# Patient Record
Sex: FEMALE | Race: WHITE | NOT HISPANIC OR LATINO | Employment: STUDENT | URBAN - METROPOLITAN AREA
[De-identification: names, ages, dates, MRNs, and addresses within clinical notes are randomized per-mention and may not be internally consistent; named-entity substitution may affect disease eponyms.]

---
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
## 2020-01-06 DIAGNOSIS — R11.10 CHRONIC VOMITING: ICD-10-CM

## 2020-01-06 DIAGNOSIS — K21.9 GASTROESOPHAGEAL REFLUX DISEASE WITHOUT ESOPHAGITIS: Primary | ICD-10-CM

## 2020-02-06 ENCOUNTER — OFFICE VISIT (OUTPATIENT)
Dept: GASTROENTEROLOGY | Facility: CLINIC | Age: 20
End: 2020-02-06

## 2020-02-06 VITALS
SYSTOLIC BLOOD PRESSURE: 116 MMHG | BODY MASS INDEX: 43.43 KG/M2 | TEMPERATURE: 98.2 F | HEIGHT: 64 IN | DIASTOLIC BLOOD PRESSURE: 72 MMHG | WEIGHT: 254.4 LBS

## 2020-02-06 DIAGNOSIS — R10.12 LEFT UPPER QUADRANT PAIN: Primary | ICD-10-CM

## 2020-02-06 DIAGNOSIS — K21.9 GASTROESOPHAGEAL REFLUX DISEASE, ESOPHAGITIS PRESENCE NOT SPECIFIED: ICD-10-CM

## 2020-02-06 DIAGNOSIS — R11.2 NAUSEA AND VOMITING, INTRACTABILITY OF VOMITING NOT SPECIFIED, UNSPECIFIED VOMITING TYPE: ICD-10-CM

## 2020-02-06 PROCEDURE — 99204 OFFICE O/P NEW MOD 45 MIN: CPT | Performed by: INTERNAL MEDICINE

## 2020-02-06 RX ORDER — ONDANSETRON 4 MG/1
TABLET, FILM COATED ORAL
COMMUNITY
Start: 2020-02-04

## 2020-02-06 RX ORDER — SODIUM CHLORIDE, SODIUM LACTATE, POTASSIUM CHLORIDE, CALCIUM CHLORIDE 600; 310; 30; 20 MG/100ML; MG/100ML; MG/100ML; MG/100ML
30 INJECTION, SOLUTION INTRAVENOUS CONTINUOUS
Status: CANCELLED | OUTPATIENT
Start: 2020-03-06

## 2020-02-06 NOTE — PROGRESS NOTES
Chief Complaint   Patient presents with   • Abdominal Pain   • Heartburn        Marcela Jimenez is a  19 y.o. female here for an initial visit for GERD, abdominal pain    HPI this 19-year-old white female patient of Dr. Damaso Pitts presents with a one-year history of abdominal pain with associated nausea and vomiting.  The pain is located along the left costal margin and radiating to the left flank.  She describes a dull aching sensation but the quality can return to sharp and stabbing especially under high stressful situations.  The pain may last for hours at a time and up to 2 days long between events.  There may be an influence with dietary intake but not always and it is not associated with bowel function.  Her normal bowel pattern is usually one stool per day no reported melena or bright red blood per rectum.  She has had extensive work-up in the past including a CT scan by her pediatrician which was negative except for a tiny kidney stone, and ultrasound which was also normal per her account and a gastric emptying study performed in December of last year which was all negative as well.  Her weight is been stable, she denies any swallowing difficulties.  She does admit to migraines that can cause nausea as well as motion sickness when riding in a car.  She notes even after eating her vomitus is usually bile-stained but without food.    Past Medical History:   Diagnosis Date   • Migraine        Current Outpatient Medications   Medication Sig Dispense Refill   • aspirin-acetaminophen-caffeine (EXCEDRIN MIGRAINE) 250-250-65 MG per tablet Take  by mouth.     • cetirizine (zyrTEC) 10 MG tablet Take  by mouth Daily.     • fluticasone (FLONASE) 50 MCG/ACT nasal spray USE 2 SPRAYS IEN QHS  1   • galcanezumab-gnlm (EMGALITY) 120 MG/ML prefilled syringe Inject 1 mL under the skin into the appropriate area as directed Every 30 (Thirty) Days. 1.12 mL 11   • ondansetron (ZOFRAN) 4 MG tablet      • rizatriptan MLT  (MAXALT-MLT) 10 MG disintegrating tablet Take 1 tablet by mouth 1 (One) Time As Needed for Migraine for up to 1 dose. May repeat in 2 hours if needed 9 tablet 11     No current facility-administered medications for this visit.        PRN Meds:.    No Known Allergies    Social History     Socioeconomic History   • Marital status: Single     Spouse name: Not on file   • Number of children: Not on file   • Years of education: Not on file   • Highest education level: Not on file   Tobacco Use   • Smoking status: Never Smoker   • Smokeless tobacco: Never Used   Substance and Sexual Activity   • Alcohol use: No   • Drug use: No   • Sexual activity: Defer       Family History   Problem Relation Age of Onset   • Stroke Maternal Grandfather    • Diabetes Maternal Grandfather    • Prostate cancer Maternal Grandfather    • Diabetes Mother    • Arthritis Father    • Diabetes Maternal Grandmother    • Arthritis Paternal Grandmother    • Stroke Paternal Grandfather    • Heart disease Paternal Grandfather        Review of Systems   Constitutional: Negative for activity change, appetite change, fatigue and unexpected weight change.   HENT: Negative for congestion, facial swelling, sore throat, trouble swallowing and voice change.    Eyes: Negative for photophobia and visual disturbance.   Respiratory: Negative for cough and choking.    Cardiovascular: Negative for chest pain.   Gastrointestinal: Positive for abdominal pain, nausea and vomiting. Negative for abdominal distention, anal bleeding, blood in stool, constipation, diarrhea and rectal pain.   Endocrine: Negative for polyphagia.   Musculoskeletal: Negative for arthralgias, gait problem and joint swelling.   Skin: Negative for color change, pallor and rash.   Allergic/Immunologic: Negative for food allergies.   Neurological: Negative for speech difficulty and headaches.   Hematological: Does not bruise/bleed easily.   Psychiatric/Behavioral: Negative for agitation, confusion  and sleep disturbance.       Vitals:    02/06/20 1527   BP: 116/72   Temp: 98.2 °F (36.8 °C)       Physical Exam   Constitutional: She is oriented to person, place, and time. She appears well-developed and well-nourished. No distress.   HENT:   Head: Normocephalic.   Mouth/Throat: Oropharynx is clear and moist. No oropharyngeal exudate.   Eyes: Conjunctivae and EOM are normal. No scleral icterus.   Neck: Normal range of motion. No thyromegaly present.   Cardiovascular: Normal rate and regular rhythm.   No murmur heard.  Pulmonary/Chest: Breath sounds normal. No respiratory distress. She has no wheezes. She has no rales.   Abdominal: Soft. Bowel sounds are normal. She exhibits no distension and no mass. There is no hepatosplenomegaly. There is tenderness.   Left upper quadrant tenderness to palpation   Musculoskeletal: Normal range of motion. She exhibits no edema or tenderness.   Lymphadenopathy:     She has no cervical adenopathy.   Neurological: She is alert and oriented to person, place, and time.   Skin: Skin is warm and dry. No rash noted. She is not diaphoretic. No erythema.   Psychiatric: She has a normal mood and affect. Her behavior is normal.   Vitals reviewed.      ASSESSMENT   #1 nausea with vomiting: Persistent issue with negative noninvasive work-up.  May be some component of a central effect given her history of migraines and vertigo.  #2 left upper quadrant abdominal pain  #3 GERD: Better with PPI      PLAN  Schedule EGD  Pending results may consider HIDA scan as well.      ICD-10-CM ICD-9-CM   1. Left upper quadrant pain R10.12 789.02   2. Gastroesophageal reflux disease, esophagitis presence not specified K21.9 530.81

## 2020-02-17 ENCOUNTER — OFFICE VISIT (OUTPATIENT)
Dept: NEUROLOGY | Facility: CLINIC | Age: 20
End: 2020-02-17

## 2020-02-17 VITALS — WEIGHT: 250 LBS | HEIGHT: 64 IN | BODY MASS INDEX: 42.68 KG/M2

## 2020-02-17 DIAGNOSIS — G43.009 MIGRAINE WITHOUT AURA AND WITHOUT STATUS MIGRAINOSUS, NOT INTRACTABLE: Primary | ICD-10-CM

## 2020-02-17 PROCEDURE — 99213 OFFICE O/P EST LOW 20 MIN: CPT | Performed by: PSYCHIATRY & NEUROLOGY

## 2020-02-17 RX ORDER — RIZATRIPTAN BENZOATE 10 MG/1
10 TABLET, ORALLY DISINTEGRATING ORAL ONCE AS NEEDED
Qty: 9 TABLET | Refills: 11 | Status: SHIPPED | OUTPATIENT
Start: 2020-02-17 | End: 2020-08-27 | Stop reason: SDUPTHER

## 2020-02-17 NOTE — PROGRESS NOTES
Subjective:     Patient ID: Marcela Jimenez is a 19 y.o. female.    History of Present Illness    Patient was seen back in the office for follow-up of migraines.  Her headaches have improved somewhat but are still problematic.  She is been having trouble with the insurance company and the pharmacy getting her prescription of Emgality.  Also Maxalt MLT 10 mg seems to be working well.  The following portions of the patient's history were reviewed and updated as appropriate: allergies, current medications, past family history, past medical history, past social history, past surgical history and problem list.      Current Outpatient Medications:   •  aspirin-acetaminophen-caffeine (EXCEDRIN MIGRAINE) 250-250-65 MG per tablet, Take  by mouth., Disp: , Rfl:   •  cetirizine (zyrTEC) 10 MG tablet, Take  by mouth Daily., Disp: , Rfl:   •  fluticasone (FLONASE) 50 MCG/ACT nasal spray, USE 2 SPRAYS IEN QHS, Disp: , Rfl: 1  •  galcanezumab-gnlm (EMGALITY) 120 MG/ML prefilled syringe, Inject 1 mL under the skin into the appropriate area as directed Every 30 (Thirty) Days., Disp: 1.12 mL, Rfl: 11  •  ondansetron (ZOFRAN) 4 MG tablet, , Disp: , Rfl:   •  rizatriptan MLT (MAXALT-MLT) 10 MG disintegrating tablet, Take 1 tablet by mouth 1 (One) Time As Needed for Migraine for up to 1 dose. May repeat in 2 hours if needed, Disp: 9 tablet, Rfl: 11    Review of Systems   Constitutional: Negative for chills, fatigue and fever.   HENT: Negative for hearing loss, tinnitus and trouble swallowing.    Eyes: Positive for photophobia and visual disturbance. Negative for pain.   Respiratory: Negative for cough, shortness of breath and wheezing.    Cardiovascular: Negative for chest pain, palpitations and leg swelling.   Gastrointestinal: Positive for nausea and vomiting. Negative for diarrhea.   Endocrine: Negative for cold intolerance, heat intolerance and polydipsia.   Genitourinary: Negative for decreased urine volume, difficulty urinating  and urgency.   Musculoskeletal: Positive for neck pain and neck stiffness. Negative for gait problem.   Skin: Negative for color change, rash and wound.   Allergic/Immunologic: Negative for environmental allergies, food allergies and immunocompromised state.   Neurological: Positive for dizziness and headaches. Negative for tremors, seizures, syncope, facial asymmetry, speech difficulty, weakness, light-headedness and numbness.   Hematological: Negative for adenopathy. Does not bruise/bleed easily.   Psychiatric/Behavioral: Negative for confusion and sleep disturbance. The patient is not nervous/anxious.           I have reviewed ROS completed by medical assistant.     Objective:    Neurologic Exam  Mental status examination was appropriate.  Funduscopy, visual fields, eye movements and pupillary reflexes were normal.  No facial weakness was noted.  Gait was normal.  No pattern of focal weakness was noted.  Physical Exam    Assessment/Plan:     Marcela was seen today for migraine.    Diagnoses and all orders for this visit:    Migraine without aura and without status migrainosus, not intractable    Other orders  -     rizatriptan MLT (MAXALT-MLT) 10 MG disintegrating tablet; Take 1 tablet by mouth 1 (One) Time As Needed for Migraine for up to 1 dose. May repeat in 2 hours if needed  -     galcanezumab-gnlm (EMGALITY) 120 MG/ML prefilled syringe; Inject 1 mL under the skin into the appropriate area as directed Every 30 (Thirty) Days.         It is quite unlikely a different migraine preventative medication will work as well as Emgality.  It is in the patient's best medical interest to stay on this medication which has been approved previously and is working.  Prescription drug management - meds as above    Follow-up in the office in one year. Thank you for allowing me to share in the care of this patient.  aDnilo Mitchell M.D.

## 2020-03-06 ENCOUNTER — ANESTHESIA (OUTPATIENT)
Dept: GASTROENTEROLOGY | Facility: HOSPITAL | Age: 20
End: 2020-03-06

## 2020-03-06 ENCOUNTER — HOSPITAL ENCOUNTER (OUTPATIENT)
Facility: HOSPITAL | Age: 20
Setting detail: HOSPITAL OUTPATIENT SURGERY
Discharge: HOME | End: 2020-03-06
Attending: INTERNAL MEDICINE | Admitting: INTERNAL MEDICINE

## 2020-03-06 ENCOUNTER — ANESTHESIA EVENT (OUTPATIENT)
Dept: GASTROENTEROLOGY | Facility: HOSPITAL | Age: 20
End: 2020-03-06

## 2020-03-06 VITALS
HEIGHT: 64 IN | WEIGHT: 251 LBS | OXYGEN SATURATION: 100 % | SYSTOLIC BLOOD PRESSURE: 133 MMHG | BODY MASS INDEX: 42.85 KG/M2 | RESPIRATION RATE: 16 BRPM | DIASTOLIC BLOOD PRESSURE: 78 MMHG | TEMPERATURE: 98.4 F | HEART RATE: 75 BPM

## 2020-03-06 DIAGNOSIS — R10.12 LEFT UPPER QUADRANT PAIN: ICD-10-CM

## 2020-03-06 DIAGNOSIS — K21.9 GASTROESOPHAGEAL REFLUX DISEASE, ESOPHAGITIS PRESENCE NOT SPECIFIED: ICD-10-CM

## 2020-03-06 DIAGNOSIS — R11.2 NAUSEA AND VOMITING, INTRACTABILITY OF VOMITING NOT SPECIFIED, UNSPECIFIED VOMITING TYPE: ICD-10-CM

## 2020-03-06 LAB
B-HCG UR QL: NEGATIVE
INTERNAL NEGATIVE CONTROL: NEGATIVE
INTERNAL POSITIVE CONTROL: POSITIVE
Lab: NORMAL

## 2020-03-06 PROCEDURE — 43239 EGD BIOPSY SINGLE/MULTIPLE: CPT | Performed by: INTERNAL MEDICINE

## 2020-03-06 PROCEDURE — 87081 CULTURE SCREEN ONLY: CPT | Performed by: INTERNAL MEDICINE

## 2020-03-06 PROCEDURE — 81025 URINE PREGNANCY TEST: CPT | Performed by: INTERNAL MEDICINE

## 2020-03-06 PROCEDURE — 25010000002 PROPOFOL 10 MG/ML EMULSION: Performed by: NURSE ANESTHETIST, CERTIFIED REGISTERED

## 2020-03-06 PROCEDURE — S0260 H&P FOR SURGERY: HCPCS | Performed by: INTERNAL MEDICINE

## 2020-03-06 PROCEDURE — 88305 TISSUE EXAM BY PATHOLOGIST: CPT | Performed by: INTERNAL MEDICINE

## 2020-03-06 RX ORDER — PROPOFOL 10 MG/ML
VIAL (ML) INTRAVENOUS CONTINUOUS PRN
Status: DISCONTINUED | OUTPATIENT
Start: 2020-03-06 | End: 2020-03-06 | Stop reason: SURG

## 2020-03-06 RX ORDER — LIDOCAINE HYDROCHLORIDE 20 MG/ML
INJECTION, SOLUTION INFILTRATION; PERINEURAL AS NEEDED
Status: DISCONTINUED | OUTPATIENT
Start: 2020-03-06 | End: 2020-03-06 | Stop reason: SURG

## 2020-03-06 RX ORDER — SODIUM CHLORIDE, SODIUM LACTATE, POTASSIUM CHLORIDE, CALCIUM CHLORIDE 600; 310; 30; 20 MG/100ML; MG/100ML; MG/100ML; MG/100ML
30 INJECTION, SOLUTION INTRAVENOUS CONTINUOUS
Status: DISCONTINUED | OUTPATIENT
Start: 2020-03-06 | End: 2020-03-06 | Stop reason: HOSPADM

## 2020-03-06 RX ORDER — SODIUM CHLORIDE 0.9 % (FLUSH) 0.9 %
10 SYRINGE (ML) INJECTION AS NEEDED
Status: DISCONTINUED | OUTPATIENT
Start: 2020-03-06 | End: 2020-03-06 | Stop reason: HOSPADM

## 2020-03-06 RX ORDER — SODIUM CHLORIDE 0.9 % (FLUSH) 0.9 %
3 SYRINGE (ML) INJECTION EVERY 12 HOURS SCHEDULED
Status: DISCONTINUED | OUTPATIENT
Start: 2020-03-06 | End: 2020-03-06 | Stop reason: HOSPADM

## 2020-03-06 RX ORDER — PROPOFOL 10 MG/ML
VIAL (ML) INTRAVENOUS AS NEEDED
Status: DISCONTINUED | OUTPATIENT
Start: 2020-03-06 | End: 2020-03-06 | Stop reason: SURG

## 2020-03-06 RX ADMIN — LIDOCAINE HYDROCHLORIDE 60 MG: 20 INJECTION, SOLUTION INFILTRATION; PERINEURAL at 12:05

## 2020-03-06 RX ADMIN — PROPOFOL 120 MCG/KG/MIN: 10 INJECTION, EMULSION INTRAVENOUS at 12:05

## 2020-03-06 RX ADMIN — SODIUM CHLORIDE, POTASSIUM CHLORIDE, SODIUM LACTATE AND CALCIUM CHLORIDE 30 ML/HR: 600; 310; 30; 20 INJECTION, SOLUTION INTRAVENOUS at 11:31

## 2020-03-06 RX ADMIN — PROPOFOL 120 MG: 10 INJECTION, EMULSION INTRAVENOUS at 12:05

## 2020-03-06 NOTE — H&P
[facility]  Pre Procedure History & Physical    Chief Complaint:   GERD, nausea with vomiting, left upper quadrant pain    Subjective     HPI:   This 19-year-old female presents the endoscopy suite for upper endoscopic evaluation.  She is had issues with reflux as well as nausea with vomiting and left upper quadrant abdominal pain.    Past Medical History:   Past Medical History:   Diagnosis Date   • Migraine        Past Surgical History:  Past Surgical History:   Procedure Laterality Date   • NO PAST SURGERIES     • WISDOM TOOTH EXTRACTION         Family History:  Family History   Problem Relation Age of Onset   • Stroke Maternal Grandfather    • Diabetes Maternal Grandfather    • Prostate cancer Maternal Grandfather    • Diabetes Mother    • Arthritis Father    • Diabetes Maternal Grandmother    • Arthritis Paternal Grandmother    • Stroke Paternal Grandfather    • Heart disease Paternal Grandfather        Social History:   reports that she has never smoked. She has never used smokeless tobacco. She reports that she does not drink alcohol or use drugs.    Medications:   No medications prior to admission.       Allergies:  Patient has no known allergies.    ROS:    Pertinent items are noted in HPI, all other systems reviewed and negative     Objective     There were no vitals taken for this visit.    Physical Exam   Constitutional: Pt is oriented to person, place, and time and well-developed, well-nourished, and in no distress.   Mouth/Throat: Oropharynx is clear and moist.   Neck: Normal range of motion.   Cardiovascular: Normal rate, regular rhythm and normal heart sounds.    Pulmonary/Chest: Effort normal and breath sounds normal.   Abdominal: Soft. Nontender  Skin: Skin is warm and dry.   Psychiatric: Mood, memory, affect and judgment normal.     Assessment/Plan     Diagnosis:  GERD  Nausea with vomiting  Left upper quadrant pain    Anticipated Surgical Procedure:  EGD    The risks,  benefits, and alternatives of this procedure have been discussed with the patient or the responsible party- the patient understands and agrees to proceed.

## 2020-03-06 NOTE — ANESTHESIA POSTPROCEDURE EVALUATION
Patient: Marcela Jimenez    Procedure Summary     Date:  03/06/20 Room / Location:   [facility]    Anesthesia Start:  1203 Anesthesia Stop:  1221    Procedure:  ESOPHAGOGASTRODUODENOSCOPY WITH COLD BIOPSIES (N/A Esophagus) Diagnosis:       Esophagitis      Gastritis      (Left upper quadrant pain [R10.12])      (Gastroesophageal reflux disease, esophagitis presence not specified [K21.9])      (Nausea and vomiting, intractability of vomiting not specified, unspecified vomiting type [R11.2])    Surgeon:  Carlos A Bateman MD Provider:  Darryl Pack MD    Anesthesia Type:  MAC ASA Status:  2          Anesthesia Type: MAC    Vitals  Vitals Value Taken Time   /78 3/6/2020 12:29 PM   Temp     Pulse 72 3/6/2020 12:29 PM   Resp 16 3/6/2020 12:29 PM   SpO2 100 % 3/6/2020 12:29 PM           Post Anesthesia Care and Evaluation    Patient location during evaluation: bedside  Patient participation: complete - patient participated  Level of consciousness: awake and alert  Pain score: 0  Pain management: adequate  Airway patency: patent  Anesthetic complications: No anesthetic complications  PONV Status: none  Cardiovascular status: acceptable  Respiratory status: acceptable  Hydration status: acceptable  Post Neuraxial Block status: Motor and sensory function returned to baseline

## 2020-03-06 NOTE — NURSING NOTE
Unsuccessful IV attempts x4 per MINGO Singletary RN and ELHAM Bond RN. Contacted IV team and will bring ultrasound machine.

## 2020-03-07 LAB — UREASE TISS QL: NEGATIVE

## 2020-03-09 LAB
CYTO UR: NORMAL
LAB AP CASE REPORT: NORMAL
PATH REPORT.FINAL DX SPEC: NORMAL
PATH REPORT.GROSS SPEC: NORMAL

## 2020-03-19 ENCOUNTER — TELEPHONE (OUTPATIENT)
Dept: GASTROENTEROLOGY | Facility: CLINIC | Age: 20
End: 2020-03-19

## 2020-03-19 NOTE — TELEPHONE ENCOUNTER
Call to mother, Gwen (see hipaa).  Advise per path report that duodenal bx uremarkable.  Stomach bx with mild inactive gastritis.  GE junction with acute and chronic inflammation.     Advise per Dr Bateman note.  Verb understanding.  States has had GES - see imaging tab 11/25/19.      States nausea sporadic - no problem lately.  Will monitor and report as necessary.  States not on any routine ppi.    Message to DR Bateman.

## 2020-03-19 NOTE — TELEPHONE ENCOUNTER
----- Message from Carlos A HUGHES MD sent at 3/11/2020  5:10 PM EDT -----  Regarding: Biopsy results  Okay to call results, recommend if abdominal pain persist, can offer HIDA scan.  If nausea and vomiting persist, can offer gastric emptying study.  ----- Message -----  From: Lab, Background User  Sent: 3/7/2020  10:55 PM EDT  To: Carlos A HUGHES MD

## 2020-03-20 NOTE — TELEPHONE ENCOUNTER
Would still encourage a trial of PPI to see if this has any effect on her episodes of vomiting.  Would advise she take it at least 2 weeks or 4 if she can.

## 2020-03-20 NOTE — TELEPHONE ENCOUNTER
Call to pt.  Advise per DR Bateman note.      Verb understanding.  States doesn't really have reflux/heartburn.  Does note that about 2-3 x/wk has episode of vomiting bile - no specific trigger.  Can occur when first wakens, middle of the day, or immediately after eating.  Always just bile - not food.      2-3x/year may have 2-3 day episode of not being able to keep anything down.  Last episode in Feb.     Update to Dr Bateman.

## 2020-03-23 ENCOUNTER — TELEPHONE (OUTPATIENT)
Dept: GASTROENTEROLOGY | Facility: CLINIC | Age: 20
End: 2020-03-23

## 2020-03-23 RX ORDER — PANTOPRAZOLE SODIUM 40 MG/1
40 TABLET, DELAYED RELEASE ORAL DAILY
Qty: 30 TABLET | Refills: 1 | Status: SHIPPED | OUTPATIENT
Start: 2020-03-23 | End: 2020-04-17

## 2020-03-23 NOTE — TELEPHONE ENCOUNTER
Attempted a PA for pantoprazole. Received this message on Cover My Meds:    This medication may be excluded from the patient's benefit. For more information, please reach out to [employer] directly at [phone]. Message from [employer]: Drug is not covered by plan

## 2020-03-23 NOTE — TELEPHONE ENCOUNTER
Called [facility] and spoke with Ruth Ann who advised that her insurance does not cover for any ppi's.     Called pt's mother and left vm for her to call back.     Also called pt and advised of the above. Advised pt she can get omeprazole 20mg po otc and try this for a couple of weeks and call us with an update. Pt verb understanding . Update sent to Dr Bateman.

## 2020-03-23 NOTE — ADDENDUM NOTE
Addended by: NICK TINEO on: 3/23/2020 10:38 AM     Modules accepted: Orders     immunizations up to date - - -

## 2020-03-23 NOTE — TELEPHONE ENCOUNTER
Call to mother, Gwen. Advise per Dr Bateman note.  Verb understanding.  Request send rx - if denied by insurance will try otc.      Laney completed for pantoprazole 40 mg 1 tab po daily, #30, R1.

## 2020-03-23 NOTE — TELEPHONE ENCOUNTER
Would advise she use omeprazole 20 mg daily and if this is not effective increase to 20 mg twice daily.  If she needs long-term treatment we can consider alternatives such as histamine blocker therapy.

## 2020-04-17 ENCOUNTER — TELEMEDICINE (OUTPATIENT)
Dept: FAMILY MEDICINE CLINIC | Facility: CLINIC | Age: 20
End: 2020-04-17

## 2020-04-17 DIAGNOSIS — K21.00 GASTROESOPHAGEAL REFLUX DISEASE WITH ESOPHAGITIS: Primary | ICD-10-CM

## 2020-04-17 DIAGNOSIS — E16.2 HYPOGLYCEMIA: ICD-10-CM

## 2020-04-17 DIAGNOSIS — R73.9 HYPERGLYCEMIA: ICD-10-CM

## 2020-04-17 DIAGNOSIS — E78.5 DYSLIPIDEMIA: ICD-10-CM

## 2020-04-17 PROCEDURE — 99214 OFFICE O/P EST MOD 30 MIN: CPT | Performed by: FAMILY MEDICINE

## 2020-04-17 RX ORDER — FAMOTIDINE 40 MG/1
40 TABLET, FILM COATED ORAL DAILY PRN
Qty: 30 TABLET | Refills: 5 | Status: SHIPPED | OUTPATIENT
Start: 2020-04-17 | End: 2020-09-15

## 2020-04-17 RX ORDER — OMEPRAZOLE 40 MG/1
40 CAPSULE, DELAYED RELEASE ORAL DAILY
Qty: 30 CAPSULE | Refills: 5 | Status: SHIPPED | OUTPATIENT
Start: 2020-04-17 | End: 2020-09-15

## 2020-04-17 NOTE — PROGRESS NOTES
Subjective   Marcela Jimenez is a 19 y.o. female. Presents today for No chief complaint on file.      Heartburn   She complains of heartburn (better on ppi;  protonix not covered, taking otc prilosec) and nausea (with vomiting once a week). She reports no abdominal pain, no chest pain, no coughing, no dysphagia, no early satiety or no wheezing. This is a chronic problem. The current episode started more than 1 month ago. The problem occurs frequently. The problem has been waxing and waning. The heartburn is of moderate intensity. The heartburn does not wake her from sleep. The heartburn does not limit her activity. The heartburn doesn't change with position. The symptoms are aggravated by certain foods. Pertinent negatives include no anemia, fatigue, melena or orthopnea. In am and when has not eaten feels nauseated as well, shaky and not well;  Has family hx of dm2. Risk factors include obesity. She has tried a PPI for the symptoms. The treatment provided mild relief. Past procedures include an EGD (reviewed report with patient and pathology;  no h pylori;  acute and chronic inflammation of GE junction, no metaplasia.).   Gastric empying study was normal last November.    Review of Systems   Constitutional: Negative for fatigue.   Respiratory: Negative for cough and wheezing.    Cardiovascular: Negative for chest pain.   Gastrointestinal: Positive for heartburn (better on ppi;  protonix not covered, taking otc prilosec) and nausea (with vomiting once a week). Negative for abdominal pain, dysphagia and melena.       Patient Active Problem List   Diagnosis   • Headache, migraine   • Renal stone   • Left upper quadrant pain   • Gastroesophageal reflux disease   • Nausea and vomiting       Social History     Socioeconomic History   • Marital status: Single     Spouse name: Not on file   • Number of children: Not on file   • Years of education: Not on file   • Highest education level: Not on file   Tobacco Use   •  Smoking status: Never Smoker   • Smokeless tobacco: Never Used   Substance and Sexual Activity   • Alcohol use: No   • Drug use: No   • Sexual activity: Defer       No Known Allergies    Current Outpatient Medications on File Prior to Visit   Medication Sig Dispense Refill   • aspirin-acetaminophen-caffeine (EXCEDRIN MIGRAINE) 250-250-65 MG per tablet Take  by mouth.     • cetirizine (zyrTEC) 10 MG tablet Take  by mouth Daily.     • fluticasone (FLONASE) 50 MCG/ACT nasal spray USE 2 SPRAYS IEN QHS  1   • galcanezumab-gnlm (EMGALITY) 120 MG/ML prefilled syringe Inject 1 mL under the skin into the appropriate area as directed Every 30 (Thirty) Days. 1.12 mL 11   • ondansetron (ZOFRAN) 4 MG tablet      • rizatriptan MLT (MAXALT-MLT) 10 MG disintegrating tablet Take 1 tablet by mouth 1 (One) Time As Needed for Migraine for up to 1 dose. May repeat in 2 hours if needed 9 tablet 11   • [DISCONTINUED] pantoprazole (PROTONIX) 40 MG EC tablet Take 1 tablet by mouth Daily. 30 tablet 1     No current facility-administered medications on file prior to visit.        Objective   There were no vitals filed for this visit.  There is no height or weight on file to calculate BMI.    Physical Exam   Constitutional: She is oriented to person, place, and time. She appears well-developed and well-nourished. No distress.   HENT:   Head: Normocephalic and atraumatic.   Right Ear: External ear normal.   Left Ear: External ear normal.   Pulmonary/Chest: Effort normal. No respiratory distress.   Neurological: She is alert and oriented to person, place, and time.   Skin: Skin is warm and dry. She is not diaphoretic.   Psychiatric: She has a normal mood and affect. Her behavior is normal. Thought content normal.   Nursing note and vitals reviewed.      Assessment/Plan   Marcela was seen today for heartburn and nausea.    Diagnoses and all orders for this visit:    Gastroesophageal reflux disease with esophagitis  -     omeprazole (PrilOSEC) 40  MG capsule; Take 1 capsule by mouth Daily.  -     famotidine (PEPCID) 40 MG tablet; Take 1 tablet by mouth Daily As Needed for Heartburn (nausea).  -     CBC & Differential    Hyperglycemia  -     Comprehensive Metabolic Panel  -     Hemoglobin A1c  -     Insulin, Total  -     C-Peptide  -     TSH    Hypoglycemia  -     Comprehensive Metabolic Panel  -     Hemoglobin A1c  -     Insulin, Total  -     C-Peptide  -     TSH    Dyslipidemia  -     Lipid Panel    -has not had labs in a year;  Has strong family hx of dm2, ? Symptoms of low (or high), will screen for dm2 and check insulin levels;  Recommend small frequent meals with high protein snacks.  -Discussed with GERD diet, avoid caffeine, chocolate, mint flavored, spicey foods, acidic foods/juices (such as citrus, tomato based).  Recommend elevated head of bed by 6 inches.  -continue ppi and can try H2 blocker for prn nausea as well;  Reports heartburn has improved on ppi; encouraged to continue to work on diet.  -due to check lipids       -Follow up: 3 months and prn

## 2020-08-27 ENCOUNTER — OFFICE VISIT (OUTPATIENT)
Dept: NEUROLOGY | Facility: CLINIC | Age: 20
End: 2020-08-27

## 2020-08-27 DIAGNOSIS — G43.009 MIGRAINE WITHOUT AURA AND WITHOUT STATUS MIGRAINOSUS, NOT INTRACTABLE: Primary | ICD-10-CM

## 2020-08-27 PROCEDURE — 99442 PR PHYS/QHP TELEPHONE EVALUATION 11-20 MIN: CPT | Performed by: PSYCHIATRY & NEUROLOGY

## 2020-08-27 RX ORDER — RIZATRIPTAN BENZOATE 10 MG/1
10 TABLET, ORALLY DISINTEGRATING ORAL ONCE AS NEEDED
Qty: 9 TABLET | Refills: 11 | Status: SHIPPED | OUTPATIENT
Start: 2020-08-27 | End: 2021-10-14 | Stop reason: SDUPTHER

## 2020-08-27 NOTE — PROGRESS NOTES
Phone visit.  Follow-up of migraine.  The patient is doing much better since she has been on Emgality injections.  No side effects.  Also uses Maxalt 10 mg MLT but uses much less of this since being on Emgality.  Happy with her current management.           Marcela was seen today for migraine.    Diagnoses and all orders for this visit:    Migraine without aura and without status migrainosus, not intractable    Other orders  -     rizatriptan MLT (MAXALT-MLT) 10 MG disintegrating tablet; Place 1 tablet on the tongue 1 (One) Time As Needed for Migraine for up to 1 dose. May repeat in 2 hours if needed  -     galcanezumab-gnlm (EMGALITY) 120 MG/ML prefilled syringe; Inject 1 mL under the skin into the appropriate area as directed Every 30 (Thirty) Days.       Prescription drug management - meds as above    Follow-up in the office in one year. Thank you for allowing me to share in the care of this patient.  Danilo Mitchell M.D.    You have chosen to receive care through a telephone visit. Do you consent to use a telephone visit for your medical care today? Yes  This visit has been rescheduled as a phone visit to comply with patient safety concerns in accordance with CDC recommendations. Total time of discussion was 15 minutes.

## 2020-09-15 ENCOUNTER — OFFICE VISIT (OUTPATIENT)
Dept: GASTROENTEROLOGY | Facility: CLINIC | Age: 20
End: 2020-09-15

## 2020-09-15 VITALS — BODY MASS INDEX: 43.54 KG/M2 | HEIGHT: 64 IN | WEIGHT: 255 LBS | TEMPERATURE: 97.5 F

## 2020-09-15 DIAGNOSIS — K92.0 HEMATEMESIS WITH NAUSEA: ICD-10-CM

## 2020-09-15 DIAGNOSIS — R10.12 LEFT UPPER QUADRANT ABDOMINAL PAIN: ICD-10-CM

## 2020-09-15 DIAGNOSIS — R11.2 NAUSEA AND VOMITING, INTRACTABILITY OF VOMITING NOT SPECIFIED, UNSPECIFIED VOMITING TYPE: Primary | ICD-10-CM

## 2020-09-15 PROCEDURE — 99214 OFFICE O/P EST MOD 30 MIN: CPT | Performed by: INTERNAL MEDICINE

## 2020-09-15 NOTE — PROGRESS NOTES
Chief Complaint   Patient presents with   • Vomiting        Marcela Jimenez is a  20 y.o. female here for a follow up visit for persistent nausea with vomiting, hematemesis    HPI this 20-year-old white female patient of Dr. Damaso Pitts presents in follow-up since she underwent upper endoscopy in March of this year.  That study was performed for persistent abdominal pain with reflux and nausea with vomiting.  She had some mild gastritis and esophagitis and was treated with a proton pump inhibitor.  She notes continued episodes of nausea with vomiting usually twice weekly and has seen some streaks of blood in her vomitus as well as bile.  Previous work-up for referral to this office had included ultrasound, CT scan, and a gastric emptying study.  All studies were reportedly normal.  We talked about repeat endoscopy if needed but would instead obtain a HIDA scan at this time to make sure gallbladder function is intact.  She is amenable to this.    Past Medical History:   Diagnosis Date   • Anemia     AS A CHILD   • History of transfusion    • Migraine    • Nausea        Current Outpatient Medications   Medication Sig Dispense Refill   • aspirin-acetaminophen-caffeine (EXCEDRIN MIGRAINE) 250-250-65 MG per tablet Take  by mouth.     • cetirizine (zyrTEC) 10 MG tablet Take  by mouth Daily.     • fluticasone (FLONASE) 50 MCG/ACT nasal spray USE 2 SPRAYS IEN QHS  1   • galcanezumab-gnlm (EMGALITY) 120 MG/ML prefilled syringe Inject 1 mL under the skin into the appropriate area as directed Every 30 (Thirty) Days. 1.12 mL 11   • ondansetron (ZOFRAN) 4 MG tablet      • rizatriptan MLT (MAXALT-MLT) 10 MG disintegrating tablet Place 1 tablet on the tongue 1 (One) Time As Needed for Migraine for up to 1 dose. May repeat in 2 hours if needed 9 tablet 11     No current facility-administered medications for this visit.        PRN Meds:.    No Known Allergies    Social History     Socioeconomic History   • Marital status: Single      Spouse name: Not on file   • Number of children: Not on file   • Years of education: Not on file   • Highest education level: Not on file   Tobacco Use   • Smoking status: Never Smoker   • Smokeless tobacco: Never Used   Substance and Sexual Activity   • Alcohol use: No   • Drug use: No   • Sexual activity: Defer       Family History   Problem Relation Age of Onset   • Stroke Maternal Grandfather    • Diabetes Maternal Grandfather    • Prostate cancer Maternal Grandfather    • Diabetes Mother    • Arthritis Father    • Diabetes Maternal Grandmother    • Arthritis Paternal Grandmother    • Stroke Paternal Grandfather    • Heart disease Paternal Grandfather        Review of Systems   Constitutional: Negative for activity change, appetite change, fatigue and unexpected weight change.   HENT: Negative for congestion, facial swelling, sore throat, trouble swallowing and voice change.    Eyes: Negative for photophobia and visual disturbance.   Respiratory: Negative for cough and choking.    Cardiovascular: Negative for chest pain.   Gastrointestinal: Positive for abdominal pain, nausea and vomiting. Negative for abdominal distention, anal bleeding, blood in stool, constipation, diarrhea and rectal pain.   Endocrine: Negative for polyphagia.   Musculoskeletal: Negative for arthralgias, gait problem and joint swelling.   Skin: Negative for color change, pallor and rash.   Allergic/Immunologic: Negative for food allergies.   Neurological: Negative for speech difficulty and headaches.   Hematological: Does not bruise/bleed easily.   Psychiatric/Behavioral: Negative for agitation, confusion and sleep disturbance.       Vitals:    09/15/20 0925   Temp: 97.5 °F (36.4 °C)       Physical Exam  Constitutional:       Appearance: She is well-developed.   HENT:      Head: Normocephalic.   Eyes:      Conjunctiva/sclera: Conjunctivae normal.   Neck:      Musculoskeletal: Normal range of motion.   Cardiovascular:      Rate and  Rhythm: Normal rate and regular rhythm.   Pulmonary:      Breath sounds: Normal breath sounds.   Abdominal:      General: Bowel sounds are normal.      Palpations: Abdomen is soft.   Musculoskeletal: Normal range of motion.   Skin:     General: Skin is warm and dry.   Neurological:      Mental Status: She is alert and oriented to person, place, and time.   Psychiatric:         Behavior: Behavior normal.         ASSESSMENT   #1 nausea with vomiting: Persistent issue with negative EGD and negative emptying study  #2 hematemesis: Suspect local trauma      PLAN  HIDA scan  Pending results may consider repeat endoscopy.      ICD-10-CM ICD-9-CM   1. Nausea and vomiting, intractability of vomiting not specified, unspecified vomiting type  R11.2 787.01   2. Left upper quadrant abdominal pain  R10.12 789.02   3. Hematemesis with nausea  K92.0 578.0     787.02

## 2020-09-24 ENCOUNTER — HOSPITAL ENCOUNTER (OUTPATIENT)
Dept: NUCLEAR MEDICINE | Facility: HOSPITAL | Age: 20
Discharge: HOME | End: 2020-09-24

## 2020-09-24 DIAGNOSIS — R11.2 NAUSEA AND VOMITING, INTRACTABILITY OF VOMITING NOT SPECIFIED, UNSPECIFIED VOMITING TYPE: ICD-10-CM

## 2020-09-24 DIAGNOSIS — K92.0 HEMATEMESIS WITH NAUSEA: ICD-10-CM

## 2020-09-24 PROCEDURE — 0 TECHNETIUM TC 99M MEBROFENIN KIT: Performed by: INTERNAL MEDICINE

## 2020-09-24 PROCEDURE — 78226 HEPATOBILIARY SYSTEM IMAGING: CPT

## 2020-09-24 PROCEDURE — A9537 TC99M MEBROFENIN: HCPCS | Performed by: INTERNAL MEDICINE

## 2020-09-24 RX ORDER — KIT FOR THE PREPARATION OF TECHNETIUM TC 99M MEBROFENIN 45 MG/10ML
1 INJECTION, POWDER, LYOPHILIZED, FOR SOLUTION INTRAVENOUS
Status: COMPLETED | OUTPATIENT
Start: 2020-09-24 | End: 2020-09-24

## 2020-09-24 RX ADMIN — MEBROFENIN 1 DOSE: 45 INJECTION, POWDER, LYOPHILIZED, FOR SOLUTION INTRAVENOUS at 07:40

## 2020-10-05 ENCOUNTER — TELEPHONE (OUTPATIENT)
Dept: GASTROENTEROLOGY | Facility: CLINIC | Age: 20
End: 2020-10-05

## 2020-10-05 NOTE — TELEPHONE ENCOUNTER
Call to mother, Gwen (see hipaa).  Advise per Dr Bateman note.  Verb understanding.     States will discuss with pt and call back.

## 2020-10-05 NOTE — TELEPHONE ENCOUNTER
----- Message from Carlos A HUGHES MD sent at 9/25/2020  4:39 PM EDT -----  Regarding: HIDA scan results  Okay to call results, essentially normal.  If still symptomatic can offer upper endoscopic evaluation.  ----- Message -----  From: Interface, Rad Results [place]  Sent: 9/24/2020  12:31 PM EDT  To: Carlos A HUGHES MD

## 2021-02-04 ENCOUNTER — TELEPHONE (OUTPATIENT)
Dept: NEUROLOGY | Facility: CLINIC | Age: 21
End: 2021-02-04

## 2021-02-04 NOTE — TELEPHONE ENCOUNTER
Provider: LUKE COVARRUBIAS  Caller: PT  Relationship to Patient: SELF    Phone Number: [phone]    Reason for Call: PT CALLED IN TODAY STATING HER EMGALITY SAVINGS CARD  THE END OF LAST YEAR AND SHE WOULD LIKE TO KNOW IF THERE'S ANY WAY SHE WOULD BE ABLE TO GET ANOTHER ONE. PT LAST SEEN 20 WITH DR. KAYE AND HAS FOLLOW UP YAMILE IN 2021 WITH LUKE OSMAN. PLEASE ADVISE.

## 2021-02-04 NOTE — TELEPHONE ENCOUNTER
Spoke with patient explained she could download Emgality savings card on her phone.  All of the cards we have  at the end of 2020

## 2021-04-16 ENCOUNTER — BULK ORDERING (OUTPATIENT)
Dept: CASE MANAGEMENT | Facility: OTHER | Age: 21
End: 2021-04-16

## 2021-04-16 DIAGNOSIS — Z23 IMMUNIZATION DUE: ICD-10-CM

## 2021-07-23 ENCOUNTER — TELEPHONE (OUTPATIENT)
Dept: FAMILY MEDICINE CLINIC | Facility: CLINIC | Age: 21
End: 2021-07-23

## 2021-07-23 NOTE — TELEPHONE ENCOUNTER
Caller: Marcela Jimenez    Relationship to patient: Self    Best call back number: [phone]    Patient is needing: PATIENT WANTING TO KNOW IF OFFICE HAS ANY SAMPLES OF EMGALITY AVAILABLE FOR PICKUP. CALLBACK REQUESTED

## 2021-09-07 ENCOUNTER — OFFICE VISIT (OUTPATIENT)
Dept: FAMILY MEDICINE CLINIC | Facility: CLINIC | Age: 21
End: 2021-09-07

## 2021-09-07 VITALS
HEART RATE: 95 BPM | BODY MASS INDEX: 46.26 KG/M2 | OXYGEN SATURATION: 98 % | HEIGHT: 64 IN | DIASTOLIC BLOOD PRESSURE: 66 MMHG | SYSTOLIC BLOOD PRESSURE: 112 MMHG | WEIGHT: 271 LBS

## 2021-09-07 DIAGNOSIS — H65.01 RIGHT ACUTE SEROUS OTITIS MEDIA, RECURRENCE NOT SPECIFIED: Primary | ICD-10-CM

## 2021-09-07 PROCEDURE — 99213 OFFICE O/P EST LOW 20 MIN: CPT | Performed by: NURSE PRACTITIONER

## 2021-09-07 RX ORDER — AMOXICILLIN AND CLAVULANATE POTASSIUM 875; 125 MG/1; MG/1
1 TABLET, FILM COATED ORAL 2 TIMES DAILY
Qty: 14 TABLET | Refills: 0 | Status: SHIPPED | OUTPATIENT
Start: 2021-09-07 | End: 2021-12-14

## 2021-09-07 NOTE — PROGRESS NOTES
"Chief Complaint  Earache (bilateral) and Sinus Problem (pressure)    Subjective          Marcela Jimenez presents to [facility]  History of Present Illness    Patient is here today with complaint of bilateral ear pain that has been bothering her off and on since mid July.  States since allergens have been getting worse she has really started to notice it more.  Denies fever.    She also reports sinus pressure.     OTC: sudafed-3 days. Just 1 pill daily.    Takes zyrtec and flonase daily.             Objective   Vital Signs:   /66   Pulse 95   Ht 162.6 cm (64.02\")   Wt 123 kg (271 lb)   SpO2 98%   BMI 46.49 kg/m²     Physical Exam  Constitutional:       Appearance: Normal appearance.   HENT:      Head: Normocephalic and atraumatic.      Right Ear: Tympanic membrane is erythematous and bulging. Tympanic membrane has decreased mobility.      Left Ear: Tympanic membrane has decreased mobility.      Nose: Rhinorrhea present.      Right Sinus: No maxillary sinus tenderness or frontal sinus tenderness.      Left Sinus: No maxillary sinus tenderness or frontal sinus tenderness.      Mouth/Throat:      Lips: Pink.      Pharynx: Oropharynx is clear.      Comments: Postnasal drainage is noted  Cardiovascular:      Rate and Rhythm: Normal rate and regular rhythm.   Pulmonary:      Effort: Pulmonary effort is normal.      Breath sounds: Normal breath sounds.   Neurological:      Mental Status: She is alert and oriented to person, place, and time.   Psychiatric:         Mood and Affect: Mood normal.         Behavior: Behavior normal.         Thought Content: Thought content normal.         Judgment: Judgment normal.        Result Review :                 Assessment and Plan    Diagnoses and all orders for this visit:    1. Right acute serous otitis media, recurrence not specified (Primary)    Other orders  -     amoxicillin-clavulanate (AUGMENTIN) 875-125 MG per tablet; Take 1 tablet by " mouth 2 (Two) Times a Day.  Dispense: 14 tablet; Refill: 0      Start antibiotic for otitis media of the right ear. Encourage patient to use Flonase and Zyrtec daily and add on Zyrtec twice daily for the next 2 weeks and then as needed. She verbalized understanding. Follow-up as needed.      Follow Up   No follow-ups on file.  Patient was given instructions and counseling regarding her condition or for health maintenance advice. Please see specific information pulled into the AVS if appropriate.

## 2021-10-05 ENCOUNTER — OFFICE VISIT (OUTPATIENT)
Dept: NEUROLOGY | Facility: CLINIC | Age: 21
End: 2021-10-05

## 2021-10-05 VITALS
SYSTOLIC BLOOD PRESSURE: 132 MMHG | DIASTOLIC BLOOD PRESSURE: 82 MMHG | WEIGHT: 268.6 LBS | BODY MASS INDEX: 45.85 KG/M2 | OXYGEN SATURATION: 99 % | RESPIRATION RATE: 16 BRPM | HEART RATE: 93 BPM | HEIGHT: 64 IN

## 2021-10-05 DIAGNOSIS — G43.009 MIGRAINE WITHOUT AURA AND WITHOUT STATUS MIGRAINOSUS, NOT INTRACTABLE: Primary | ICD-10-CM

## 2021-10-05 PROCEDURE — 99213 OFFICE O/P EST LOW 20 MIN: CPT | Performed by: NURSE PRACTITIONER

## 2021-10-05 RX ORDER — COVID-19 TEST SPECIMEN COLLECT
MISCELLANEOUS MISCELLANEOUS SEE ADMIN INSTRUCTIONS
COMMUNITY
Start: 2021-09-13 | End: 2021-12-14

## 2021-10-05 NOTE — PROGRESS NOTES
Subjective:     Patient ID: Marcela Jimenez is a 21 y.o. female presenting for follow-up for migraine headaches. She is a previous patient of Dr. Mitchell, last seen about 1 year ago. She is taking Emgality 120 mg every 30 days and uses rizatriptan 10 mg as needed. This combination works very well for her. She states she rarely has to use the rizatriptan. She denies any new problems since her last visit    History of Present Illness  The following portions of the patient's history were reviewed and updated as appropriate: allergies, current medications, past family history, past medical history, past social history, past surgical history and problem list.    Review of Systems   Eyes: Positive for visual disturbance.   Gastrointestinal: Positive for nausea and vomiting.   Musculoskeletal: Positive for neck pain and neck stiffness. Negative for gait problem.   Neurological: Positive for light-headedness and headaches. Negative for dizziness, tremors, seizures, syncope, facial asymmetry, speech difficulty, weakness and numbness.        Objective:    Neurologic Exam  General: Well nourished, well developed, and in no acute distress.  HEENT: Normocephalic/atraumatic. Mucous membranes moist. Sclerae anicteric.   Heart: Regular rate and rhythm. No murmurs, rubs or gallops.  Lungs: Clear to auscultation bilaterally.  Skin: No notable rashes or lesions on the visible surfaces.   Extremities: No clubbing, cyanosis or significant edema.   Psychiatric: Pleasant, cooperative, and appropriate.   Neurologic Exam:  Mental Status:  Alert and oriented. Speech is fluent. Comprehension is intact.   Cranial Nerves II-XII: Pupils equal, round, reactive to light. Extraocular movements are full and conjugate in all directions. Pursuit movements do not provoke any apparent dizziness or discomfort.  No nystagmus noted.  Hearing is intact to voice. Facial strength and sensation are preserved and symmetric. Tongue and palate midline. Voice  non-hoarse, non-dysarthric.   Motor: Normal bulk and tone of bilateral upper and lower extremities. Strength is 5/5 in all 4 extremities both proximally and distally. There are no abnormal or involuntary movements noted.  Sensation: Intact to light touch throughout. Romberg was negative with no significant sway.   Coordination: Fully intact. Finger-to-nose performed accurately bilaterally.  Reflexes: The deep tendon reflexes are 2+/4 in bilateral biceps, brachioradialis, triceps, patella, and Achilles.  No pathologic reflexes were noted.  Gait: Normal. No ataxia or apraxia.         Physical Exam    Assessment/Plan:     Diagnoses and all orders for this visit:    1. Migraine without aura and without status migrainosus, not intractable (Primary)       She is doing well. No changes made today. She will continue Emgality 120 mg every 30 days along with rizatriptan 10 mg as needed. She will call with any problems, otherwise is to follow-up in 1 year.

## 2021-10-14 ENCOUNTER — TELEPHONE (OUTPATIENT)
Dept: NEUROLOGY | Facility: CLINIC | Age: 21
End: 2021-10-14

## 2021-10-14 RX ORDER — RIZATRIPTAN BENZOATE 10 MG/1
10 TABLET, ORALLY DISINTEGRATING ORAL ONCE AS NEEDED
Qty: 9 TABLET | Refills: 11 | Status: SHIPPED | OUTPATIENT
Start: 2021-10-14

## 2021-10-14 NOTE — TELEPHONE ENCOUNTER
Provider: LUKE OSMAN    Caller: OLIVER    Relationship to Patient: SELF    Pharmacy:   [facility] @ [address]    Phone Number: [phone]    Reason for Call:   WILL BE DUE FOR REFILL ON EMGALITY 120 & MAXALT 10MG NEXT WEEK. WILL NEED SENT TO NEW PHARMACY LISTED ABOVE. PLEASE ADVISE     When was the patient last seen: 10-5

## 2021-10-18 NOTE — PROGRESS NOTES
Subjective   Marcela Jimenez is a 18 y.o. female. Presents today for   Chief Complaint   Patient presents with   • Abdominal Pain   • Establish Care       Abdominal Pain   This is a recurrent problem. The current episode started more than 1 month ago. The onset quality is gradual. The problem occurs every several days. The most recent episode lasted 2 hours. The problem has been gradually improving. The pain is located in the LUQ. The pain is at a severity of 6/10. The quality of the pain is cramping. The abdominal pain radiates to the back and LUQ. Associated symptoms include anorexia, diarrhea (occly), a fever, nausea and vomiting. Pertinent negatives include no constipation, dysuria, hematochezia, hematuria or melena. The pain is aggravated by certain positions and eating. The pain is relieved by certain positions. She has tried nothing for the symptoms. The treatment provided no relief. Prior diagnostic workup includes CT scan and ultrasound (CT 4/25/19, renal stone 0.1cm, saw urology told not cause of pain, is on topamax so will follow with urology.  CT o/w negative.). There is no history of Crohn's disease or ulcerative colitis.   Reports better now;  Was worse with stress;  No current diarrhea;  Occly heartburn, was on antacid tablet that helped with heartburn but not pain or nausea.  No family hx of crohns or uc.      Hx of chronic migraines.    Review of Systems   Constitutional: Positive for fever.   Gastrointestinal: Positive for abdominal pain, anorexia, diarrhea (occly), nausea and vomiting. Negative for constipation, hematochezia and melena.   Genitourinary: Negative for dysuria and hematuria.       Patient Active Problem List   Diagnosis   • Headache, migraine     Past Medical History:   Diagnosis Date   • Migraine      Past Surgical History:   Procedure Laterality Date   • NO PAST SURGERIES     • WISDOM TOOTH EXTRACTION         Family History   Problem Relation Age of Onset   • Stroke Maternal  "Grandfather    • Diabetes Maternal Grandfather    • Prostate cancer Maternal Grandfather    • Diabetes Mother    • Arthritis Father    • Diabetes Maternal Grandmother    • Arthritis Paternal Grandmother    • Stroke Paternal Grandfather    • Heart disease Paternal Grandfather          Social History     Socioeconomic History   • Marital status: Single     Spouse name: Not on file   • Number of children: Not on file   • Years of education: Not on file   • Highest education level: Not on file   Tobacco Use   • Smoking status: Never Smoker   • Smokeless tobacco: Never Used   Substance and Sexual Activity   • Alcohol use: No   • Drug use: No       No Known Allergies    Current Outpatient Medications on File Prior to Visit   Medication Sig Dispense Refill   • aspirin-acetaminophen-caffeine (EXCEDRIN MIGRAINE) 250-250-65 MG per tablet Take  by mouth.     • cetirizine (zyrTEC) 10 MG tablet Take  by mouth Daily.     • fluticasone (FLONASE) 50 MCG/ACT nasal spray USE 2 SPRAYS IEN QHS  1   • rizatriptan MLT (MAXALT-MLT) 10 MG disintegrating tablet Take 1 tablet by mouth 1 (One) Time As Needed for Migraine for up to 1 dose. May repeat in 2 hours if needed 9 tablet 11   • topiramate (TOPAMAX) 25 MG tablet Take 3 tablets by mouth Every Night. 90 tablet 11     No current facility-administered medications on file prior to visit.        Objective   Vitals:    05/21/19 0930   BP: 110/60   BP Location: Left arm   Patient Position: Sitting   Cuff Size: Adult   Pulse: 90   SpO2: 97%   Weight: 103 kg (228 lb)   Height: 162.6 cm (64\")       Physical Exam   Constitutional: She appears well-developed and well-nourished.   HENT:   Head: Normocephalic and atraumatic.   Neck: Neck supple. No JVD present. No thyromegaly present.   Cardiovascular: Normal rate, regular rhythm and normal heart sounds. Exam reveals no gallop and no friction rub.   No murmur heard.  Pulmonary/Chest: Effort normal and breath sounds normal. No respiratory distress. " She has no wheezes. She has no rales.   Abdominal: Soft. Bowel sounds are normal. She exhibits no distension. There is no hepatosplenomegaly. There is no tenderness. There is no rebound and no guarding.   Musculoskeletal: She exhibits no edema.   Neurological: She is alert.   Skin: Skin is warm and dry.   Psychiatric: She has a normal mood and affect. Her behavior is normal.   Nursing note and vitals reviewed.      CELIAC PANEL REFLEX TO TITER   Order: [id number]   Suggestion Information displayed in this report will not trend or trigger automated decision support.    Ref Range & Units Value   Tissue Transglutaminase IgA Ab 0 - 14.9 U/mL <0.5    Comment: Negative < 15.0 U/mL   Tissue Transglutaminase IgG Ab 0 - 14.9 U/mL <0.8    Comment: Negative < 15.0 U/mL   Deamidated Gliadin Peptide IgA 0 - 14.9 U/mL 0.8    Comment: Negative < 15.0 U/mL   Deamidated Gliadin Peptide IgG 0 - 14.9 U/mL 0.4    Comment: Negative < 15.0 U/mL   (note)   All Celiac Panel tests performed by multiplex flow   immunoassay.   IgA Quant 68 - 378 mg/dL 74    Specimen Collected: 04/25/19  3:07 PM Last Resulted: 04/26/19  2:04 PM   Received From: [facility]  Result Received: 05/21/19  9:19 AM    Received Information   SEDIMENTATION RATE   Order: [id number]   Suggestion Information displayed in this report will not trend or trigger automated decision support.    Ref Range & Units Value   Erythrocyte Sedimentation Rate 0 - 20 mm/Hr 6    Specimen Collected: 04/25/19  3:07 PM Last Resulted: 04/25/19  3:31 PM   Received From: [facility]  Result Received: 05/21/19  9:19 AM    Received Information   LIPASE   Order: [id number]   Suggestion Information displayed in this report will not trend or trigger automated decision support.    Ref Range & Units Value   Lipase 23 - 300 U/L 62    Specimen Collected: 04/25/19  3:07 PM Last Resulted: 04/25/19  3:44 PM   Received From: [facility]  Result Received: 05/21/19  9:19 AM    Received  Information   AMYLASE   Order: [id number]   Suggestion Information displayed in this report will not trend or trigger automated decision support.    Ref Range & Units Value   Amylase 30 - 110 U/L 86    Specimen Collected: 04/25/19  3:07 PM Last Resulted: 04/25/19  3:44 PM   Received From: [facility]  Result Received: 05/21/19  9:19 AM    Received Information   COMPREHENSIVE METABOLIC PANEL   Order: [id number]   Suggestion Information displayed in this report will not trend or trigger automated decision support.    Ref Range & Units Value   Sodium 137 - 145 mmol/L 137    Potassium 3.5 - 5.1 mmol/L 4.0    Chloride 98 - 107 mmol/L 106    Carbon Dioxide 22 - 30 mmol/L 22    Glucose 74 - 99 mg/dL 88    BUN 7 - 20 mg/dL 10    Creatinine 0.7 - 1.5 mg/dL 0.7    BUN/Creatinine Ratio  RATIO 14.3    Total Protein 6.3 - 8.2 g/dL 7.3    Albumin 3.5 - 5.0 g/dL 4.2    Globulin 1.5 - 4.5 g/dL 3.1    Albumin/Globulin Ratio 1.1 - 2.5 RATIO 1.4    Calcium 8.4 - 10.2 mg/dL 9.1    Total Bilirubin 0.2 - 1.3 mg/dL 0.3    AST/SGOT 15 - 46 U/L 29    ALT/SGPT 13 - 69 U/L 36    Alkaline Phosphatase 38 - 126 U/L 79    Est GFR by Clearance >60 /1.73 m2 >60    Comment: Result only valid for patients age 18 to 70 yrs. Body Mass not taken into account.   Multiply by 1.212 if . Results provided are valid only for patients who are 18 to 70 years of age. Results do not take into account body mass.   Specimen Collected: 04/25/19  3:07 PM Last Resulted: 04/25/19  3:44 PM   Received From: [facility]  Result Received: 05/21/19  9:19 AM    Received Information   Contains abnormal data CBC AND DIFFERENTIAL   Order: [id number]   Suggestion Information displayed in this report will not trend or trigger automated decision support.    Ref Range & Units Value   WBC 4.5 - 11.0 10*3/uL 6.81    RBC 4.0 - 5.2 10*6/uL 4.60    Hemoglobin 12.0 - 16.0 g/dL 14.5    Hematocrit 36.0 - 46.0 % 42.4    MCV 80.0 - 100.0 fL 92.2    MCH 26.0 - 34.0 pg  31.5    MCHC 31.0 - 37.0 g/dL 34.2    RDW 12.0 - 16.8 % 12.7    Platelets 140 - 440 10*3/uL 245    MPV 6.7 - 10.8 fL 10.5    Differential Type  (arb'U) Hospital CBC w/AutoDiff    Neutrophil Rel % 45 - 80 % 70.3    Lymphocyte Rel % 15 - 50 % 21.7    Monocyte Rel % 0 - 15 % 6.6    Eosinophil% 0 - 7 % 1.2    BASO% 0 - 2 % 0.1    Immature Granulocyte% 0 % 0.1 Abnormally high     Nucleated RBC % 0 /100(WBC) 0    Neutrophils Absolute 2.0 - 8.8 10*3/uL 4.78    Lymphocytes Absolute 0.7 - 5.5 10*3/uL 1.48    Monocytes Absolute 0.0 - 1.7 10*3/uL 0.45    EOS-Absolute 0.0 - 0.8 10*3/uL 0.08    Basophil Abs 0.0 - 0.2 10*3/uL 0.01    Immature Granulocyte Abs <1 10*3/uL 0.01    Specimen Collected: 04/25/19  3:07 PM Last Resulted: 04/25/19  3:29 PM   Received From: [facility]  Result Received: 05/21/19  9:19 AM    Received Information       Assessment/Plan   Marcela was seen today for abdominal pain and establish care.    Diagnoses and all orders for this visit:    LUQ abdominal pain  -     H. Pylori Breath Test - Breath, Lung  -     dicyclomine (BENTYL) 10 MG capsule; Take 1 capsule by mouth 4 (Four) Times a Day Before Meals & at Bedtime. As needed abd pain and cramping    Diarrhea, unspecified type  -     dicyclomine (BENTYL) 10 MG capsule; Take 1 capsule by mouth 4 (Four) Times a Day Before Meals & at Bedtime. As needed abd pain and cramping    Gastroesophageal reflux disease without esophagitis  -     H. Pylori Breath Test - Breath, Lung  -     famotidine (PEPCID) 40 MG tablet; Take 1 tablet by mouth Daily.    Irritable bowel syndrome with diarrhea  -     dicyclomine (BENTYL) 10 MG capsule; Take 1 capsule by mouth 4 (Four) Times a Day Before Meals & at Bedtime. As needed abd pain and cramping    work-up negative;  Will add on h pylori breath test.  I discussed at length GERD and IBS diet to help minimize symptoms and will start prn meds for now.   Reports doing better.  D/w GI and endoscopy but declines as doing ok now.     D/w topamax as cuase of renal stones, f/u with urology as planned  Strong fam hx of dm2, will need to screen for over time         -Follow up: 6 months and prn   20.95

## 2021-12-14 ENCOUNTER — OFFICE VISIT (OUTPATIENT)
Dept: FAMILY MEDICINE CLINIC | Facility: CLINIC | Age: 21
End: 2021-12-14

## 2021-12-14 VITALS
OXYGEN SATURATION: 99 % | WEIGHT: 272 LBS | HEART RATE: 99 BPM | BODY MASS INDEX: 46.44 KG/M2 | HEIGHT: 64 IN | DIASTOLIC BLOOD PRESSURE: 74 MMHG | TEMPERATURE: 97.5 F | SYSTOLIC BLOOD PRESSURE: 126 MMHG

## 2021-12-14 DIAGNOSIS — H65.93 MIDDLE EAR EFFUSION, BILATERAL: Primary | ICD-10-CM

## 2021-12-14 PROCEDURE — 99213 OFFICE O/P EST LOW 20 MIN: CPT | Performed by: NURSE PRACTITIONER

## 2021-12-14 RX ORDER — PREDNISONE 20 MG/1
20 TABLET ORAL 2 TIMES DAILY
Qty: 6 TABLET | Refills: 0 | Status: SHIPPED | OUTPATIENT
Start: 2021-12-14 | End: 2022-07-01

## 2021-12-14 NOTE — PROGRESS NOTES
"Subjective   Marcela Jimenez is a 21 y.o. female who presents c/o pain in both ears. Had ear infection in September followed by a cold. Ear pain never fully resolved.     History of Present Illness   L>R ear infection in September, but pretty bad cold after that. Sinuses settled on own. 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 Is taking flonase and zyrtec headaches under fair control  The following portions of the patient's history were reviewed and updated as appropriate: allergies, current medications, past family history, past medical history, past social history, past surgical history and problem list.    Review of Systems   Constitutional: Negative for chills and fever.   HENT: Positive for congestion and ear pain. Negative for ear discharge and sore throat.    Respiratory: Negative for cough.    Allergic/Immunologic: Negative for environmental allergies.   Neurological: Positive for headaches.   Hematological: Negative.    Psychiatric/Behavioral: Negative.      /74   Pulse 99   Temp 97.5 °F (36.4 °C) (Infrared)   Ht 162.6 cm (64\")   Wt 123 kg (272 lb)   LMP 11/30/2021 (Approximate)   SpO2 99%   BMI 46.69 kg/m²     Objective   Physical Exam  Constitutional:       Appearance: She is obese. She is not ill-appearing.   HENT:      Head: Normocephalic.      Jaw: There is normal jaw occlusion.      Salivary Glands: Right salivary gland is not diffusely enlarged or tender. Left salivary gland is not diffusely enlarged or tender.      Right Ear: Tenderness present. A middle ear effusion is present. Tympanic membrane is retracted. Tympanic membrane is not injected, scarred, erythematous or bulging.      Left Ear: A middle ear effusion is present. Tympanic membrane is retracted. Tympanic membrane is not injected, scarred, erythematous or bulging.      Nose:      Right Sinus: No maxillary sinus tenderness or frontal sinus tenderness.      Left Sinus: No maxillary " sinus tenderness or frontal sinus tenderness.   Cardiovascular:      Rate and Rhythm: Normal rate and regular rhythm.      Heart sounds: Normal heart sounds.   Pulmonary:      Effort: Pulmonary effort is normal.      Breath sounds: Normal breath sounds.   Lymphadenopathy:      Head:      Right side of head: No submental, submandibular, tonsillar, preauricular or posterior auricular adenopathy.      Left side of head: No submental, submandibular, tonsillar, preauricular or posterior auricular adenopathy.   Neurological:      Mental Status: She is alert.   Psychiatric:         Attention and Perception: Attention and perception normal.         Mood and Affect: Mood normal.         Speech: Speech normal.         Behavior: Behavior normal.         Cognition and Memory: Cognition normal.         Judgment: Judgment normal.       Assessment/Plan   Problems Addressed this Visit     None      Visit Diagnoses     Middle ear effusion, bilateral    -  Primary      Diagnoses       Codes Comments    Middle ear effusion, bilateral    -  Primary ICD-10-CM: H65.93  ICD-9-CM: 381.4         TANIA--Is consistent with flonase and zyrtec. Add short burst prednisone to settle inflammation. Continue symptom controllers.   FU if not improved 2 weeks

## 2022-07-01 ENCOUNTER — OFFICE VISIT (OUTPATIENT)
Dept: FAMILY MEDICINE CLINIC | Facility: CLINIC | Age: 22
End: 2022-07-01

## 2022-07-01 VITALS
SYSTOLIC BLOOD PRESSURE: 120 MMHG | WEIGHT: 271 LBS | BODY MASS INDEX: 46.26 KG/M2 | RESPIRATION RATE: 16 BRPM | OXYGEN SATURATION: 96 % | DIASTOLIC BLOOD PRESSURE: 64 MMHG | HEIGHT: 64 IN | HEART RATE: 87 BPM

## 2022-07-01 DIAGNOSIS — R53.83 OTHER FATIGUE: ICD-10-CM

## 2022-07-01 DIAGNOSIS — F41.1 GAD (GENERALIZED ANXIETY DISORDER): Primary | ICD-10-CM

## 2022-07-01 DIAGNOSIS — E55.9 VITAMIN D DEFICIENCY: ICD-10-CM

## 2022-07-01 PROBLEM — E66.01 MORBID OBESITY (HCC): Status: ACTIVE | Noted: 2022-07-01

## 2022-07-01 PROCEDURE — 99214 OFFICE O/P EST MOD 30 MIN: CPT | Performed by: FAMILY MEDICINE

## 2022-07-01 RX ORDER — BUSPIRONE HYDROCHLORIDE 10 MG/1
TABLET ORAL
Qty: 60 TABLET | Refills: 2 | Status: SHIPPED | OUTPATIENT
Start: 2022-07-01 | End: 2022-08-08 | Stop reason: SDUPTHER

## 2022-07-01 NOTE — PATIENT INSTRUCTIONS
"\"1-8-5-Almost None!\"  Healthy Habits Start Early    EAT 5 OR MORE SERVINGS OF VEGETABLES AND FRUITS EVERY DAY.    Help Marcela get three vegetables and two fruits each day. Red, green, yellow, orange...encourage them to try all the colors so they can enjoy different flavors and get more vitamins.    How can I help Marcela do this?  ---------------------------------------------  -BE PATIENT WITH Marcela, remember it may take 10 times before they start to like new food. So, start with small bites and just keep trying.  -Serve at least one vegetable or fruit at every meal. Even try two. Remember, portions do not have to be as big as you think.  -Encourage eating fruits and vegetables instead of drinking them..it's a better way to get fiber and vitamins..so limit the amount of juice to 1/2 cup per day for children 1-6 years and one cup per day for children 7-18 years of age. Try using 1/2 part water and 1/2 part juice.    Spend less than two hours per day watching television and other screen media. Screen media includes video games, movies and computer use for entertainment.    How can I help Marcela do this?  -Turn off the TV at dinner. Dinner is the best time to hang out with your kids and just talk, learn about their day, and tell them about your day. Your kids have a lot to learn from you and dinner is a great time to share.  "

## 2022-07-01 NOTE — PROGRESS NOTES
Subjective   Marcela Jimenez is a 21 y.o. female. Presents today for   Chief Complaint   Patient presents with   • Anxiety   • Fatigue       Anxiety  Presents for initial visit. Onset was 1 to 6 months ago. The problem has been unchanged. Symptoms include decreased concentration, depressed mood, excessive worry, insomnia, irritability and nervous/anxious behavior. Patient reports no chest pain, palpitations, panic or shortness of breath. Symptoms occur constantly. The severity of symptoms is moderate.     Risk factors include family history (death in family). There is no history of anxiety/panic attacks or depression. Past treatments include nothing.   Fatigue  This is a new problem. The current episode started more than 1 month ago. The problem occurs intermittently. Associated symptoms include fatigue. Pertinent negatives include no chest pain. Associated symptoms comments: Feels rested in am;   Has had time falling asleep;  Has new puppy that gets up at night.. She has tried nothing for the symptoms. The treatment provided no relief.     No snoring;  No prior sleep study    Review of Systems   Constitutional: Positive for fatigue and irritability.   Respiratory: Negative for shortness of breath.    Cardiovascular: Negative for chest pain and palpitations.   Psychiatric/Behavioral: Positive for decreased concentration. The patient is nervous/anxious and has insomnia.        Patient Active Problem List   Diagnosis   • Headache, migraine   • Renal stone   • Left upper quadrant pain   • Gastroesophageal reflux disease   • Nausea and vomiting       Social History     Socioeconomic History   • Marital status: Single   Tobacco Use   • Smoking status: Never Smoker   • Smokeless tobacco: Never Used   Vaping Use   • Vaping Use: Never used   Substance and Sexual Activity   • Alcohol use: No   • Drug use: No   • Sexual activity: Defer       No Known Allergies    Current Outpatient Medications on File Prior to Visit  "  Medication Sig Dispense Refill   • aspirin-acetaminophen-caffeine (EXCEDRIN MIGRAINE) 250-250-65 MG per tablet Take  by mouth.     • cetirizine (zyrTEC) 10 MG tablet Take  by mouth Daily.     • fluticasone (FLONASE) 50 MCG/ACT nasal spray USE 2 SPRAYS IEN QHS  1   • galcanezumab-gnlm (EMGALITY) 120 MG/ML auto-injector pen Inject 1 mL under the skin into the appropriate area as directed Every 30 (Thirty) Days. 1.12 mL 5   • ondansetron (ZOFRAN) 4 MG tablet      • rizatriptan MLT (MAXALT-MLT) 10 MG disintegrating tablet Place 1 tablet on the tongue 1 (One) Time As Needed for Migraine for up to 1 dose. May repeat in 2 hours if needed 9 tablet 11   • [DISCONTINUED] predniSONE (DELTASONE) 20 MG tablet Take 1 tablet by mouth 2 (Two) Times a Day. 6 tablet 0     No current facility-administered medications on file prior to visit.       Objective   Vitals:    07/01/22 0904   BP: 120/64   Pulse: 87   Resp: 16   SpO2: 96%   Weight: 123 kg (271 lb)   Height: 162.6 cm (64\")   PainSc: 0-No pain     Body mass index is 46.52 kg/m².    Physical Exam  Vitals and nursing note reviewed.   Constitutional:       Appearance: Normal appearance. She is not toxic-appearing or diaphoretic.   HENT:      Head: Normocephalic and atraumatic.   Musculoskeletal:      Cervical back: Neck supple.   Skin:     General: Skin is warm and dry.      Capillary Refill: Capillary refill takes less than 2 seconds.   Neurological:      Mental Status: She is alert.   Psychiatric:         Mood and Affect: Mood normal.         Behavior: Behavior normal.         Assessment & Plan   Diagnoses and all orders for this visit:    1. ROJAS (generalized anxiety disorder) (Primary)  -     busPIRone (BUSPAR) 10 MG tablet; 1/2 po 2x daily x 7 days then 1 po bid  Dispense: 60 tablet; Refill: 2    2. Other fatigue  -     CBC & Differential  -     Comprehensive Metabolic Panel  -     TSH  -     Vitamin B12    3. Vitamin D deficiency  -     Vitamin D 25 Hydroxy      Consider " sleep study  Will initiate buspar as mom on and helping;  Call or return if not doing well  Check vitamin D  Check labs for fatigue and mood changes       -Follow up: 6 weeks and prn

## 2022-07-02 LAB
25(OH)D3+25(OH)D2 SERPL-MCNC: 23 NG/ML (ref 30–100)
ALBUMIN SERPL-MCNC: 4.1 G/DL (ref 3.9–5)
ALBUMIN/GLOB SERPL: 1.6 {RATIO} (ref 1.2–2.2)
ALP SERPL-CCNC: 89 IU/L (ref 44–121)
ALT SERPL-CCNC: 27 IU/L (ref 0–32)
AST SERPL-CCNC: 18 IU/L (ref 0–40)
BASOPHILS # BLD AUTO: 0 X10E3/UL (ref 0–0.2)
BASOPHILS NFR BLD AUTO: 0 %
BILIRUB SERPL-MCNC: 0.4 MG/DL (ref 0–1.2)
BUN SERPL-MCNC: 11 MG/DL (ref 6–20)
BUN/CREAT SERPL: 14 (ref 9–23)
CALCIUM SERPL-MCNC: 9.3 MG/DL (ref 8.7–10.2)
CHLORIDE SERPL-SCNC: 103 MMOL/L (ref 96–106)
CO2 SERPL-SCNC: 24 MMOL/L (ref 20–29)
CREAT SERPL-MCNC: 0.76 MG/DL (ref 0.57–1)
EGFRCR SERPLBLD CKD-EPI 2021: 114 ML/MIN/1.73
EOSINOPHIL # BLD AUTO: 0.1 X10E3/UL (ref 0–0.4)
EOSINOPHIL NFR BLD AUTO: 1 %
ERYTHROCYTE [DISTWIDTH] IN BLOOD BY AUTOMATED COUNT: 12.9 % (ref 11.7–15.4)
GLOBULIN SER CALC-MCNC: 2.6 G/DL (ref 1.5–4.5)
GLUCOSE SERPL-MCNC: 93 MG/DL (ref 65–99)
HCT VFR BLD AUTO: 42.5 % (ref 34–46.6)
HGB BLD-MCNC: 14.1 G/DL (ref 11.1–15.9)
IMM GRANULOCYTES # BLD AUTO: 0 X10E3/UL (ref 0–0.1)
IMM GRANULOCYTES NFR BLD AUTO: 0 %
LYMPHOCYTES # BLD AUTO: 0.9 X10E3/UL (ref 0.7–3.1)
LYMPHOCYTES NFR BLD AUTO: 18 %
MCH RBC QN AUTO: 30.7 PG (ref 26.6–33)
MCHC RBC AUTO-ENTMCNC: 33.2 G/DL (ref 31.5–35.7)
MCV RBC AUTO: 92 FL (ref 79–97)
MONOCYTES # BLD AUTO: 0.4 X10E3/UL (ref 0.1–0.9)
MONOCYTES NFR BLD AUTO: 7 %
NEUTROPHILS # BLD AUTO: 3.6 X10E3/UL (ref 1.4–7)
NEUTROPHILS NFR BLD AUTO: 74 %
PLATELET # BLD AUTO: 229 X10E3/UL (ref 150–450)
POTASSIUM SERPL-SCNC: 4.3 MMOL/L (ref 3.5–5.2)
PROT SERPL-MCNC: 6.7 G/DL (ref 6–8.5)
RBC # BLD AUTO: 4.6 X10E6/UL (ref 3.77–5.28)
SODIUM SERPL-SCNC: 141 MMOL/L (ref 134–144)
TSH SERPL DL<=0.005 MIU/L-ACNC: 1.4 UIU/ML (ref 0.45–4.5)
VIT B12 SERPL-MCNC: 229 PG/ML (ref 232–1245)
WBC # BLD AUTO: 4.9 X10E3/UL (ref 3.4–10.8)

## 2022-07-09 NOTE — PROGRESS NOTES
Call results to patient.  B12 is low - send b12 1000mcg IM weekly x 8 week with syringes;  after this call for monthly injections.  I would recommend injections over oral as increase faster.  Vitamin D low - send vitamin D3 5000 iu po daily  Thyroid normal  Blood counts normal

## 2022-08-01 ENCOUNTER — TELEPHONE (OUTPATIENT)
Dept: FAMILY MEDICINE CLINIC | Facility: CLINIC | Age: 22
End: 2022-08-01

## 2022-08-01 NOTE — TELEPHONE ENCOUNTER
Caller: Marcela Jimenez    Relationship: Self    Best call back number: [phone]    What is the best time to reach you: ANYTIME    Who are you requesting to speak with (clinical staff, provider,  specific staff member): DR. LUZ    What was the call regarding: PATIENT IS WANTING TO KNOW IF DR. LUZ HAS ANY SAMPLES OF EMGALITY.     PLEASE CALL PATIENT BACK TO LET HER KNOW.

## 2022-08-08 ENCOUNTER — OFFICE VISIT (OUTPATIENT)
Dept: FAMILY MEDICINE CLINIC | Facility: CLINIC | Age: 22
End: 2022-08-08

## 2022-08-08 VITALS
DIASTOLIC BLOOD PRESSURE: 70 MMHG | SYSTOLIC BLOOD PRESSURE: 100 MMHG | BODY MASS INDEX: 46.26 KG/M2 | OXYGEN SATURATION: 97 % | WEIGHT: 271 LBS | HEIGHT: 64 IN | HEART RATE: 117 BPM

## 2022-08-08 DIAGNOSIS — F41.1 GAD (GENERALIZED ANXIETY DISORDER): ICD-10-CM

## 2022-08-08 PROCEDURE — 99213 OFFICE O/P EST LOW 20 MIN: CPT | Performed by: FAMILY MEDICINE

## 2022-08-08 RX ORDER — BUSPIRONE HYDROCHLORIDE 10 MG/1
TABLET ORAL
Qty: 180 TABLET | Refills: 3 | Status: SHIPPED | OUTPATIENT
Start: 2022-08-08

## 2022-08-08 NOTE — PROGRESS NOTES
Subjective   Marcela Jimenez is a 21 y.o. female. Presents today for   Chief Complaint   Patient presents with   • Anxiety       Anxiety  Presents for follow-up visit. Symptoms include excessive worry and nervous/anxious behavior. Symptoms occur occasionally. The quality of sleep is fair. Nighttime awakenings: occasional.     Compliance with medications is % (doing much better).       Review of Systems   Psychiatric/Behavioral: The patient is nervous/anxious.        Patient Active Problem List   Diagnosis   • Headache, migraine   • Renal stone   • Left upper quadrant pain   • Gastroesophageal reflux disease   • Nausea and vomiting   • Morbid obesity (HCC)       Social History     Socioeconomic History   • Marital status: Single   Tobacco Use   • Smoking status: Never Smoker   • Smokeless tobacco: Never Used   Vaping Use   • Vaping Use: Never used   Substance and Sexual Activity   • Alcohol use: No   • Drug use: No   • Sexual activity: Defer       No Known Allergies    Current Outpatient Medications on File Prior to Visit   Medication Sig Dispense Refill   • aspirin-acetaminophen-caffeine (EXCEDRIN MIGRAINE) 250-250-65 MG per tablet Take  by mouth.     • cetirizine (zyrTEC) 10 MG tablet Take  by mouth Daily.     • fluticasone (FLONASE) 50 MCG/ACT nasal spray USE 2 SPRAYS IEN QHS  1   • galcanezumab-gnlm (EMGALITY) 120 MG/ML auto-injector pen Inject 1 mL under the skin into the appropriate area as directed Every 30 (Thirty) Days. 1.12 mL 5   • ondansetron (ZOFRAN) 4 MG tablet      • rizatriptan MLT (MAXALT-MLT) 10 MG disintegrating tablet Place 1 tablet on the tongue 1 (One) Time As Needed for Migraine for up to 1 dose. May repeat in 2 hours if needed 9 tablet 11   • [DISCONTINUED] busPIRone (BUSPAR) 10 MG tablet 1/2 po 2x daily x 7 days then 1 po bid 60 tablet 2     No current facility-administered medications on file prior to visit.       Objective   Vitals:    08/08/22 1356   BP: 100/70   Pulse: 117   SpO2:  "97%   Weight: 123 kg (271 lb)   Height: 162.6 cm (64\")     Body mass index is 46.52 kg/m².    Physical Exam  Vitals and nursing note reviewed.   Constitutional:       Appearance: Normal appearance. She is not toxic-appearing or diaphoretic.   HENT:      Head: Normocephalic and atraumatic.   Musculoskeletal:      Cervical back: Neck supple.   Skin:     General: Skin is warm and dry.      Capillary Refill: Capillary refill takes less than 2 seconds.   Neurological:      Mental Status: She is alert.   Psychiatric:         Mood and Affect: Mood normal.         Behavior: Behavior normal.         Assessment & Plan   Diagnoses and all orders for this visit:    1. ROJAS (generalized anxiety disorder)  -     busPIRone (BUSPAR) 10 MG tablet; 1 po bid  Dispense: 180 tablet; Refill: 3    doing well on medicaiton, will contineu buspar and sent in 90 days  Gave samlpes emgality to mom and has       -Follow up: 6 months andprn  "

## 2022-11-29 ENCOUNTER — TELEPHONE (OUTPATIENT)
Dept: FAMILY MEDICINE CLINIC | Facility: CLINIC | Age: 22
End: 2022-11-29

## 2022-12-05 RX ORDER — CEFDINIR 300 MG/1
300 CAPSULE ORAL 2 TIMES DAILY
Qty: 20 CAPSULE | Refills: 0 | Status: SHIPPED | OUTPATIENT
Start: 2022-12-05

## 2023-10-19 DIAGNOSIS — F41.1 GAD (GENERALIZED ANXIETY DISORDER): ICD-10-CM

## 2023-10-19 RX ORDER — BUSPIRONE HYDROCHLORIDE 10 MG/1
TABLET ORAL
Qty: 180 TABLET | Refills: 3 | Status: SHIPPED | OUTPATIENT
Start: 2023-10-19

## 2024-03-28 NOTE — TELEPHONE ENCOUNTER
Caller: Barbara Marcela J    Relationship: Self    Best call back number: [phone]     Requested Prescriptions:   Requested Prescriptions     Pending Prescriptions Disp Refills    galcanezumab-gnlm (EMGALITY) 120 MG/ML auto-injector pen 1.12 mL 5     Sig: Inject 1 mL under the skin into the appropriate area as directed Every 30 (Thirty) Days.        Pharmacy where request should be sent: [id number] [address] [facility] [phone] [facility] [phone] FX     Last office visit with prescribing clinician: 8/8/2022   Last telemedicine visit with prescribing clinician: Visit date not found   Next office visit with prescribing clinician: Visit date not found     Additional details provided by patient: PATIENT STATED SHE HAS BEEN OUT OF THIS MEDICATION FOR THREE MONTHS DUE TO INSURANCE ISSUES    PATIET IS ALSO REQUESTING TO KNOW IF THERE ARE ANY SAMPLES AVAILABLE OF THIS MEDICATION IN CASE SHE CONTINUES HAVING ISSUES WITH INSURANCE COVERAGE    Does the patient have less than a 3 day supply:  [x] Yes  [] No    Would you like a call back once the refill request has been completed: [] Yes [x] No    If the office needs to give you a call back, can they leave a voicemail: [x] Yes [] No    Kenya David Rep   03/28/24 09:25 EDT

## 2024-05-13 DIAGNOSIS — G43.009 MIGRAINE WITHOUT AURA AND WITHOUT STATUS MIGRAINOSUS, NOT INTRACTABLE: Primary | ICD-10-CM

## 2024-06-11 ENCOUNTER — OFFICE VISIT (OUTPATIENT)
Dept: NEUROLOGY | Facility: CLINIC | Age: 24
End: 2024-06-11
Payer: COMMERCIAL

## 2024-06-11 VITALS
WEIGHT: 292 LBS | BODY MASS INDEX: 49.85 KG/M2 | HEART RATE: 93 BPM | SYSTOLIC BLOOD PRESSURE: 126 MMHG | DIASTOLIC BLOOD PRESSURE: 64 MMHG | OXYGEN SATURATION: 95 % | HEIGHT: 64 IN

## 2024-06-11 DIAGNOSIS — G43.009 MIGRAINE WITHOUT AURA AND WITHOUT STATUS MIGRAINOSUS, NOT INTRACTABLE: Primary | ICD-10-CM

## 2024-06-11 PROCEDURE — 99213 OFFICE O/P EST LOW 20 MIN: CPT | Performed by: NURSE PRACTITIONER

## 2024-06-11 RX ORDER — RIZATRIPTAN BENZOATE 10 MG/1
10 TABLET, ORALLY DISINTEGRATING ORAL ONCE AS NEEDED
Qty: 9 TABLET | Refills: 11 | Status: SHIPPED | OUTPATIENT
Start: 2024-06-11

## 2024-06-11 RX ORDER — PROPRANOLOL HCL 60 MG
60 CAPSULE, EXTENDED RELEASE 24HR ORAL DAILY
Qty: 90 CAPSULE | Refills: 0 | Status: SHIPPED | OUTPATIENT
Start: 2024-06-11

## 2024-06-11 RX ORDER — PROPRANOLOL HCL 60 MG
60 CAPSULE, EXTENDED RELEASE 24HR ORAL DAILY
Qty: 30 CAPSULE | Refills: 1 | Status: SHIPPED | OUTPATIENT
Start: 2024-06-11 | End: 2024-06-11

## 2024-06-11 NOTE — PROGRESS NOTES
Subjective   Marcela Jimenez is a 23 y.o. female presenting for follow-up for migraine headaches.  It has been almost 3 years since she was seen.  She was previously taking Emgality 120 mg every 30 days and this was working very well for her.  She had been on topiramate prior to the Emgality but had side effects and therefore it was discontinued.  Unfortunately, her insurance denied coverage for her Emgality given that she had not been on 2 generic oral preventative medications.  She does have at least 4 more migraines per month.  She uses rizatriptan as needed for rescue.    Migraine     Review of Systems   Eyes:  Positive for photophobia and visual disturbance.   Gastrointestinal:  Positive for nausea. Negative for vomiting.   Musculoskeletal:  Positive for neck pain and neck stiffness.   Neurological:  Positive for dizziness, light-headedness and headache. Negative for tremors, seizures, syncope, facial asymmetry, speech difficulty, weakness, numbness, memory problem and confusion.     I have reviewed and confirmed the accuracy of the patient's history: Chief complaint, HPI, ROS, Subjective, and Past Family Social History as entered by the MA/LUZN/RN. Disha Maldonado MA 06/11/24      Objective     Physical Examination:  General Appearance:  Well developed, well nourished, well groomed, alert, and cooperative.  HEENT: Normocephalic.    Neck and Spine: Normal range of motion.  Normal alignment. No mass or tenderness. No bruits.  Cardiac: Regular rate and rhythm. No murmurs.  Peripheral Vasculature: Radial and pedal pulses are equal and symmetric.   Extremities: No edema or deformities. Normal joint ROM.  Skin: No rashes or birth marks.      Neurological examination:   Higher Integrative Function: Oriented to time, place, and person. Normal registration, recall attention span and concentration. Normal language including comprehension, spontaneous speech, repetition, reading, writing, naming and vocabulary. No neglect  with normal visual-spatial function and construction. Normal fund of knowledge and higher integrative function.   CN II: Pupils are equal, round and reactive to light. Normal visual acuity and visual fields.   CN III IV VI: Extraocular movements are full without nystagmus.   CN V: Normal facial sensation and strength of muscles of mastication.   CN VII: Facial movements are symmetric. No weakness.   CN VIII: Auditory acuity is normal.  CN IX & X: Symmetric palatal movement.   CN XI: Sternocleidomastoid and trapezius are normal. No weakness.   CN XII: The tongue is midline. No atrophy or fasciculations.  Motor: Normal muscle strength, bulk and tone in upper and lower extremities. No fasciculations, rigidity, spasticity, or abnormal movements.   Reflexes: 2+ in the upper and lower extremities. Plantar responses are flexor.   Sensation: Normal light touch, pinprick, vibration, temperature, and proprioception in the arms and legs.   Station and Gait: Normal gait and station.   Coordination: Finger to nose test shows no dysmetria. Rapid alternating movements are normal. Heel to shin is normal.           Assessment & Plan   Diagnoses and all orders for this visit:    1. Migraine without aura and without status migrainosus, not intractable (Primary)    Other orders  -     propranolol LA (Inderal LA) 60 MG 24 hr capsule; Take 1 capsule by mouth Daily.  Dispense: 30 capsule; Refill: 1  -     rizatriptan MLT (MAXALT-MLT) 10 MG disintegrating tablet; Place 1 tablet on the tongue 1 (One) Time As Needed for Migraine. May repeat in 2 hours if needed  Dispense: 9 tablet; Refill: 11    Since her insurance is denying coverage for Emgality given that she has not been on 2 oral preventative options, we are going to try a beta-blocker.  If this does not work well for her we will resend the Emgality prescription and it hopefully will be approved at that time.  I am going to start her on Inderal 60 mg daily.  She will try this for the  next 4 weeks and reach out if this does not help and we will send the Emgality at that time.  She will continue rizatriptan 10 mg as needed for rescue.  She did inquire about Botox injections today.  Given that the Emgality previously worked well for her, if the beta-blocker does not help and the Emgality does not seem to be as effective as it previously was, we can consider Botox at that time but for now we will hold off.

## 2024-06-11 NOTE — LETTER
June 11, 2024       No Recipients    Patient: Marcela Jimenez   YOB: 2000   Date of Visit: 6/11/2024     Dear Damaso Pitts, DO:       Thank you for referring Marcela Jimenez to me for evaluation. Below are the relevant portions of my assessment and plan of care.    If you have questions, please do not hesitate to call me. I look forward to following Marcela along with you.         Sincerely,        KAYLA Loco        CC:   No Recipients    Tramaine Saxena APRN  06/11/24 1133  Sign when Signing Visit  Subjective   Marcela Jimenez is a 23 y.o. female presenting for follow-up for migraine headaches.  It has been almost 3 years since she was seen.  She was previously taking Emgality 120 mg every 30 days and this was working very well for her.  She had been on topiramate prior to the Emgality but had side effects and therefore it was discontinued.  Unfortunately, her insurance denied coverage for her Emgality given that she had not been on 2 generic oral preventative medications.  She does have at least 4 more migraines per month.  She uses rizatriptan as needed for rescue.    Migraine     Review of Systems   Eyes:  Positive for photophobia and visual disturbance.   Gastrointestinal:  Positive for nausea. Negative for vomiting.   Musculoskeletal:  Positive for neck pain and neck stiffness.   Neurological:  Positive for dizziness, light-headedness and headache. Negative for tremors, seizures, syncope, facial asymmetry, speech difficulty, weakness, numbness, memory problem and confusion.     I have reviewed and confirmed the accuracy of the patient's history: Chief complaint, HPI, ROS, Subjective, and Past Family Social History as entered by the MA/CARMEN/RN. Disha Maldonado MA 06/11/24      Objective     Physical Examination:  General Appearance:  Well developed, well nourished, well groomed, alert, and cooperative.  HEENT: Normocephalic.    Neck and Spine: Normal range of motion.  Normal  alignment. No mass or tenderness. No bruits.  Cardiac: Regular rate and rhythm. No murmurs.  Peripheral Vasculature: Radial and pedal pulses are equal and symmetric.   Extremities: No edema or deformities. Normal joint ROM.  Skin: No rashes or birth marks.      Neurological examination:   Higher Integrative Function: Oriented to time, place, and person. Normal registration, recall attention span and concentration. Normal language including comprehension, spontaneous speech, repetition, reading, writing, naming and vocabulary. No neglect with normal visual-spatial function and construction. Normal fund of knowledge and higher integrative function.   CN II: Pupils are equal, round and reactive to light. Normal visual acuity and visual fields.   CN III IV VI: Extraocular movements are full without nystagmus.   CN V: Normal facial sensation and strength of muscles of mastication.   CN VII: Facial movements are symmetric. No weakness.   CN VIII: Auditory acuity is normal.  CN IX & X: Symmetric palatal movement.   CN XI: Sternocleidomastoid and trapezius are normal. No weakness.   CN XII: The tongue is midline. No atrophy or fasciculations.  Motor: Normal muscle strength, bulk and tone in upper and lower extremities. No fasciculations, rigidity, spasticity, or abnormal movements.   Reflexes: 2+ in the upper and lower extremities. Plantar responses are flexor.   Sensation: Normal light touch, pinprick, vibration, temperature, and proprioception in the arms and legs.   Station and Gait: Normal gait and station.   Coordination: Finger to nose test shows no dysmetria. Rapid alternating movements are normal. Heel to shin is normal.           Assessment & Plan   Diagnoses and all orders for this visit:    1. Migraine without aura and without status migrainosus, not intractable (Primary)    Other orders  -     propranolol LA (Inderal LA) 60 MG 24 hr capsule; Take 1 capsule by mouth Daily.  Dispense: 30 capsule; Refill: 1  -      rizatriptan MLT (MAXALT-MLT) 10 MG disintegrating tablet; Place 1 tablet on the tongue 1 (One) Time As Needed for Migraine. May repeat in 2 hours if needed  Dispense: 9 tablet; Refill: 11    Since her insurance is denying coverage for Emgality given that she has not been on 2 oral preventative options, we are going to try a beta-blocker.  If this does not work well for her we will resend the Emgality prescription and it hopefully will be approved at that time.  I am going to start her on Inderal 60 mg daily.  She will try this for the next 4 weeks and reach out if this does not help and we will send the Emgality at that time.  She will continue rizatriptan 10 mg as needed for rescue.  She did inquire about Botox injections today.  Given that the Emgality previously worked well for her, if the beta-blocker does not help and the Emgality does not seem to be as effective as it previously was, we can consider Botox at that time but for now we will hold off.

## 2024-08-06 ENCOUNTER — TELEPHONE (OUTPATIENT)
Dept: NEUROLOGY | Facility: CLINIC | Age: 24
End: 2024-08-06
Payer: COMMERCIAL

## 2024-08-06 DIAGNOSIS — G43.009 MIGRAINE WITHOUT AURA AND WITHOUT STATUS MIGRAINOSUS, NOT INTRACTABLE: ICD-10-CM

## 2024-08-06 NOTE — TELEPHONE ENCOUNTER
Caller: Marcela Jimenez    Relationship: Self    Best call back number: [phone]    Which medication are you concerned about:   propranolol LA (INDERAL LA) 60 MG 24 hr capsule    Who prescribed you this medication:   LUKE OSMAN    When did you start taking this medication:   6-11-24    What are your concerns:   THIS RX ISN'T WORKING AS WELL. PT SAYS IT HAS CUT DOWN A FEW MIGRAINES. PT HAS GONE FROM ABOUT 5 DAYS A WEEKS TO ABOUT 3 TO 4 DAYS A WEEK WITH MIGRAINES.    THIS RX DOESN'T WORK AS GOOD FOR THE PT AS THE EMGALITY. WITH EMGALITLY, THE PT ONLY HAS ABOUT ONE MIGRAINE A MONTH.     PLEASE CALL PT TO DISCUSS RX OPTIONS.

## 2024-09-16 RX ORDER — PROPRANOLOL HCL 60 MG
60 CAPSULE, EXTENDED RELEASE 24HR ORAL DAILY
Qty: 90 CAPSULE | Refills: 0 | Status: SHIPPED | OUTPATIENT
Start: 2024-09-16

## 2024-12-17 RX ORDER — PROPRANOLOL HYDROCHLORIDE 60 MG/1
60 CAPSULE, EXTENDED RELEASE ORAL DAILY
Qty: 90 CAPSULE | Refills: 0 | Status: SHIPPED | OUTPATIENT
Start: 2024-12-17

## 2025-06-12 ENCOUNTER — OFFICE VISIT (OUTPATIENT)
Dept: NEUROLOGY | Facility: CLINIC | Age: 25
End: 2025-06-12
Payer: COMMERCIAL

## 2025-06-12 VITALS
SYSTOLIC BLOOD PRESSURE: 128 MMHG | HEIGHT: 64 IN | WEIGHT: 293 LBS | OXYGEN SATURATION: 98 % | HEART RATE: 72 BPM | DIASTOLIC BLOOD PRESSURE: 68 MMHG | BODY MASS INDEX: 50.02 KG/M2

## 2025-06-12 DIAGNOSIS — G43.009 MIGRAINE WITHOUT AURA AND WITHOUT STATUS MIGRAINOSUS, NOT INTRACTABLE: Primary | ICD-10-CM

## 2025-06-12 PROCEDURE — 99213 OFFICE O/P EST LOW 20 MIN: CPT | Performed by: NURSE PRACTITIONER

## 2025-06-12 RX ORDER — RIZATRIPTAN BENZOATE 10 MG/1
10 TABLET, ORALLY DISINTEGRATING ORAL ONCE AS NEEDED
Qty: 9 TABLET | Refills: 11 | Status: SHIPPED | OUTPATIENT
Start: 2025-06-12

## 2025-06-12 NOTE — PROGRESS NOTES
Subjective   Marcela Jimenez is a 24 y.o. female presenting for follow up for migraine headaches. She is doing well. She takes Emgality 120 mg every 30 days and rizatriptan as needed. She reports muscle pain after taking the rizatriptan, however it does improve her headache.     Migraine    Associated symptoms: no dizziness and no weakness       Review of Systems   Neurological:  Positive for headache. Negative for dizziness, tremors, seizures, syncope, facial asymmetry, speech difficulty, weakness, light-headedness, numbness, memory problem and confusion.     I have reviewed and confirmed the accuracy of the patient's history: Chief complaint, HPI, ROS, Subjective, and Past Family Social History as entered by the MA/LPN/RN. Disha Maldonado MA 06/12/25      Objective     Physical Examination:  General Appearance:  Well developed, well nourished, well groomed, alert, and cooperative.  HEENT: Normocephalic.    Neck and Spine: Normal range of motion.  Normal alignment. No mass or tenderness. No bruits.  Cardiac: Regular rate and rhythm. No murmurs.  Peripheral Vasculature: Radial and pedal pulses are equal and symmetric.   Extremities: No edema or deformities. Normal joint ROM.  Skin: No rashes or birth marks.      Neurological examination:   Higher Integrative Function: Oriented to time, place, and person. Normal registration, recall attention span and concentration. Normal language including comprehension, spontaneous speech, repetition, reading, writing, naming and vocabulary. No neglect with normal visual-spatial function and construction. Normal fund of knowledge and higher integrative function.   CN II: Pupils are equal, round and reactive to light. Normal visual acuity and visual fields.   CN III IV VI: Extraocular movements are full without nystagmus.   CN V: Normal facial sensation and strength of muscles of mastication.   CN VII: Facial movements are symmetric. No weakness.   CN VIII: Auditory acuity is  normal.  CN IX & X: Symmetric palatal movement.   CN XI: Sternocleidomastoid and trapezius are normal. No weakness.   CN XII: The tongue is midline. No atrophy or fasciculations.  Motor: Normal muscle strength, bulk and tone in upper and lower extremities. No fasciculations, rigidity, spasticity, or abnormal movements.   Reflexes: 2+ in the upper and lower extremities. Plantar responses are flexor.   Sensation: Normal light touch, pinprick, vibration, temperature, and proprioception in the arms and legs.   Station and Gait: Normal gait and station.   Coordination: Finger to nose test shows no dysmetria. Rapid alternating movements are normal. Heel to shin is normal.           Assessment & Plan   Diagnoses and all orders for this visit:    1. Migraine without aura and without status migrainosus, not intractable (Primary)  -     galcanezumab-gnlm (EMGALITY) 120 MG/ML auto-injector pen; Inject 1 mL under the skin into the appropriate area as directed Every 30 (Thirty) Days.  Dispense: 1 mL; Refill: 11    Other orders  -     rizatriptan MLT (MAXALT-MLT) 10 MG disintegrating tablet; Place 1 tablet on the tongue 1 (One) Time As Needed for Migraine. May repeat in 2 hours if needed  Dispense: 9 tablet; Refill: 11    Doing well. Continue Emgality 120 mg every 30 days. Gave samples of Nurtec and Ubrelvy to try in place of rizatriptan. Instructions given. SE reviewed. If either of these work well she will call for a prescription.     Follow up annually, sooner if needed.

## 2025-07-25 ENCOUNTER — SPECIALTY PHARMACY (OUTPATIENT)
Dept: NEUROLOGY | Facility: CLINIC | Age: 25
End: 2025-07-25
Payer: COMMERCIAL

## 2025-07-25 NOTE — PROGRESS NOTES
Specialty Pharmacy Patient Management Program  Refill Outreach     Emgality PA submitted 7/25/25 KEY [id number] Rashid  7/25/2025  11:41 EDT

## 2025-07-25 NOTE — PROGRESS NOTES
Specialty Pharmacy Patient Management Program  Refill Outreach     Emgality approved until 7/25/2026 KEY [id number] Rashid  7/25/2025  12:46 EDT

## (undated) DEVICE — FRCP BX RADJAW4 NDL 2.8 240CM LG OG BX40

## (undated) DEVICE — CANN O2 ETCO2 FITS ALL CONN CO2 SMPL A/ 7IN DISP LF

## (undated) DEVICE — BITEBLOCK OMNI BLOC

## (undated) DEVICE — ADAPT CLN BIOGUARD AIR/H2O DISP

## (undated) DEVICE — KT ORCA ORCAPOD DISP STRL

## (undated) DEVICE — SENSR O2 OXIMAX FNGR A/ 18IN NONSTR

## (undated) DEVICE — LN SMPL CO2 SHTRM SD STREAM W/M LUER

## (undated) DEVICE — TUBING, SUCTION, 1/4" X 10', STRAIGHT: Brand: MEDLINE